# Patient Record
Sex: MALE | Race: WHITE | NOT HISPANIC OR LATINO | Employment: FULL TIME | URBAN - METROPOLITAN AREA
[De-identification: names, ages, dates, MRNs, and addresses within clinical notes are randomized per-mention and may not be internally consistent; named-entity substitution may affect disease eponyms.]

---

## 2017-01-24 ENCOUNTER — OFFICE VISIT - HEALTHEAST (OUTPATIENT)
Dept: FAMILY MEDICINE | Facility: CLINIC | Age: 52
End: 2017-01-24

## 2017-01-24 DIAGNOSIS — F10.10 ALCOHOL ABUSE: ICD-10-CM

## 2017-01-24 DIAGNOSIS — F41.1 GENERALIZED ANXIETY DISORDER: ICD-10-CM

## 2017-01-24 ASSESSMENT — MIFFLIN-ST. JEOR: SCORE: 1684.83

## 2017-02-09 ENCOUNTER — OFFICE VISIT - HEALTHEAST (OUTPATIENT)
Dept: BEHAVIORAL HEALTH | Facility: CLINIC | Age: 52
End: 2017-02-09

## 2017-02-09 ENCOUNTER — COMMUNICATION - HEALTHEAST (OUTPATIENT)
Dept: FAMILY MEDICINE | Facility: CLINIC | Age: 52
End: 2017-02-09

## 2017-02-09 DIAGNOSIS — F41.1 GAD (GENERALIZED ANXIETY DISORDER): ICD-10-CM

## 2017-02-09 DIAGNOSIS — F10.20 MODERATE ALCOHOL USE DISORDER (H): ICD-10-CM

## 2017-02-20 ENCOUNTER — COMMUNICATION - HEALTHEAST (OUTPATIENT)
Dept: FAMILY MEDICINE | Facility: CLINIC | Age: 52
End: 2017-02-20

## 2017-02-20 DIAGNOSIS — J30.2 SEASONAL ALLERGIES: ICD-10-CM

## 2017-03-01 ENCOUNTER — OFFICE VISIT - HEALTHEAST (OUTPATIENT)
Dept: BEHAVIORAL HEALTH | Facility: CLINIC | Age: 52
End: 2017-03-01

## 2017-03-01 DIAGNOSIS — F41.1 GAD (GENERALIZED ANXIETY DISORDER): ICD-10-CM

## 2017-03-01 DIAGNOSIS — F10.20 MODERATE ALCOHOL USE DISORDER (H): ICD-10-CM

## 2017-03-09 ENCOUNTER — OFFICE VISIT - HEALTHEAST (OUTPATIENT)
Dept: BEHAVIORAL HEALTH | Facility: CLINIC | Age: 52
End: 2017-03-09

## 2017-03-09 DIAGNOSIS — F41.1 GAD (GENERALIZED ANXIETY DISORDER): ICD-10-CM

## 2017-03-09 DIAGNOSIS — F10.20 MODERATE ALCOHOL USE DISORDER (H): ICD-10-CM

## 2017-03-15 ENCOUNTER — OFFICE VISIT - HEALTHEAST (OUTPATIENT)
Dept: BEHAVIORAL HEALTH | Facility: CLINIC | Age: 52
End: 2017-03-15

## 2017-03-15 DIAGNOSIS — F10.20 MODERATE ALCOHOL USE DISORDER (H): ICD-10-CM

## 2017-03-15 DIAGNOSIS — F41.1 GAD (GENERALIZED ANXIETY DISORDER): ICD-10-CM

## 2017-03-16 ENCOUNTER — OFFICE VISIT - HEALTHEAST (OUTPATIENT)
Dept: BEHAVIORAL HEALTH | Facility: CLINIC | Age: 52
End: 2017-03-16

## 2017-03-16 ENCOUNTER — OFFICE VISIT - HEALTHEAST (OUTPATIENT)
Dept: FAMILY MEDICINE | Facility: CLINIC | Age: 52
End: 2017-03-16

## 2017-03-16 DIAGNOSIS — F41.1 GAD (GENERALIZED ANXIETY DISORDER): ICD-10-CM

## 2017-03-16 DIAGNOSIS — F10.10 ALCOHOL ABUSE: ICD-10-CM

## 2017-03-16 DIAGNOSIS — F10.20 MODERATE ALCOHOL USE DISORDER (H): ICD-10-CM

## 2017-03-16 ASSESSMENT — MIFFLIN-ST. JEOR: SCORE: 1653.08

## 2017-03-29 ENCOUNTER — OFFICE VISIT - HEALTHEAST (OUTPATIENT)
Dept: BEHAVIORAL HEALTH | Facility: CLINIC | Age: 52
End: 2017-03-29

## 2017-03-29 DIAGNOSIS — F41.1 GAD (GENERALIZED ANXIETY DISORDER): ICD-10-CM

## 2017-03-29 DIAGNOSIS — F10.20 MODERATE ALCOHOL USE DISORDER (H): ICD-10-CM

## 2017-04-09 ENCOUNTER — COMMUNICATION - HEALTHEAST (OUTPATIENT)
Dept: FAMILY MEDICINE | Facility: CLINIC | Age: 52
End: 2017-04-09

## 2017-04-09 DIAGNOSIS — J45.20 MILD INTERMITTENT ASTHMA, UNCOMPLICATED: ICD-10-CM

## 2017-12-13 ENCOUNTER — COMMUNICATION - HEALTHEAST (OUTPATIENT)
Dept: FAMILY MEDICINE | Facility: CLINIC | Age: 52
End: 2017-12-13

## 2017-12-13 DIAGNOSIS — J45.20 MILD INTERMITTENT ASTHMA, UNCOMPLICATED: ICD-10-CM

## 2019-01-28 ENCOUNTER — OFFICE VISIT - HEALTHEAST (OUTPATIENT)
Dept: FAMILY MEDICINE | Facility: CLINIC | Age: 54
End: 2019-01-28

## 2019-01-28 DIAGNOSIS — J45.20 MILD INTERMITTENT ASTHMA, UNCOMPLICATED: ICD-10-CM

## 2019-01-28 DIAGNOSIS — M21.42 FLAT FEET, BILATERAL: ICD-10-CM

## 2019-01-28 DIAGNOSIS — Z91.018 ALLERGY TO NUTS: ICD-10-CM

## 2019-01-28 DIAGNOSIS — F10.10 ALCOHOL ABUSE: ICD-10-CM

## 2019-01-28 DIAGNOSIS — M21.41 FLAT FEET, BILATERAL: ICD-10-CM

## 2019-01-28 DIAGNOSIS — Z23 NEED FOR TDAP VACCINATION: ICD-10-CM

## 2019-01-28 LAB
ALBUMIN SERPL-MCNC: 4.1 G/DL (ref 3.5–5)
ALP SERPL-CCNC: 54 U/L (ref 45–120)
ALT SERPL W P-5'-P-CCNC: 33 U/L (ref 0–45)
AST SERPL W P-5'-P-CCNC: 24 U/L (ref 0–40)
BILIRUB DIRECT SERPL-MCNC: 0.2 MG/DL
BILIRUB SERPL-MCNC: 0.6 MG/DL (ref 0–1)
PROT SERPL-MCNC: 7.2 G/DL (ref 6–8)

## 2019-01-28 RX ORDER — ALBUTEROL SULFATE 90 UG/1
2 AEROSOL, METERED RESPIRATORY (INHALATION) EVERY 6 HOURS PRN
Qty: 3 INHALER | Refills: 3 | Status: SHIPPED | OUTPATIENT
Start: 2019-01-28 | End: 2021-08-24

## 2019-01-28 ASSESSMENT — MIFFLIN-ST. JEOR: SCORE: 1735.27

## 2019-02-06 ENCOUNTER — AMBULATORY - HEALTHEAST (OUTPATIENT)
Dept: PODIATRY | Facility: CLINIC | Age: 54
End: 2019-02-06

## 2019-02-07 ENCOUNTER — OFFICE VISIT - HEALTHEAST (OUTPATIENT)
Dept: PODIATRY | Facility: CLINIC | Age: 54
End: 2019-02-07

## 2019-02-07 DIAGNOSIS — M21.6X2 PRONATION DEFORMITY OF BOTH FEET: ICD-10-CM

## 2019-02-07 DIAGNOSIS — M21.6X1 PRONATION DEFORMITY OF BOTH FEET: ICD-10-CM

## 2019-02-07 ASSESSMENT — MIFFLIN-ST. JEOR: SCORE: 1734.73

## 2019-02-26 ENCOUNTER — AMBULATORY - HEALTHEAST (OUTPATIENT)
Dept: OTHER | Facility: CLINIC | Age: 54
End: 2019-02-26

## 2019-03-11 ENCOUNTER — OFFICE VISIT - HEALTHEAST (OUTPATIENT)
Dept: FAMILY MEDICINE | Facility: CLINIC | Age: 54
End: 2019-03-11

## 2019-03-11 DIAGNOSIS — Z13.1 SCREENING FOR DIABETES MELLITUS: ICD-10-CM

## 2019-03-11 DIAGNOSIS — F10.90 HEAVY ALCOHOL USE: ICD-10-CM

## 2019-03-11 DIAGNOSIS — Z13.220 SCREENING FOR CHOLESTEROL LEVEL: ICD-10-CM

## 2019-03-11 DIAGNOSIS — Z13.0 SCREENING FOR DEFICIENCY ANEMIA: ICD-10-CM

## 2019-03-11 LAB
ALBUMIN SERPL-MCNC: 4 G/DL (ref 3.5–5)
ALP SERPL-CCNC: 51 U/L (ref 45–120)
ALT SERPL W P-5'-P-CCNC: 24 U/L (ref 0–45)
AST SERPL W P-5'-P-CCNC: 18 U/L (ref 0–40)
BILIRUB DIRECT SERPL-MCNC: 0.2 MG/DL
BILIRUB SERPL-MCNC: 0.4 MG/DL (ref 0–1)
CHOLEST SERPL-MCNC: 206 MG/DL
ERYTHROCYTE [DISTWIDTH] IN BLOOD BY AUTOMATED COUNT: 11.2 % (ref 11–14.5)
FASTING STATUS PATIENT QL REPORTED: YES
FASTING STATUS PATIENT QL REPORTED: YES
GLUCOSE BLD-MCNC: 72 MG/DL (ref 70–99)
HCT VFR BLD AUTO: 48.1 % (ref 40–54)
HDLC SERPL-MCNC: 46 MG/DL
HGB BLD-MCNC: 16.1 G/DL (ref 14–18)
LDLC SERPL CALC-MCNC: 132 MG/DL
MCH RBC QN AUTO: 30.7 PG (ref 27–34)
MCHC RBC AUTO-ENTMCNC: 33.6 G/DL (ref 32–36)
MCV RBC AUTO: 91 FL (ref 80–100)
PLATELET # BLD AUTO: 144 THOU/UL (ref 140–440)
PMV BLD AUTO: 8.7 FL (ref 7–10)
PROT SERPL-MCNC: 6.9 G/DL (ref 6–8)
RBC # BLD AUTO: 5.26 MILL/UL (ref 4.4–6.2)
TRIGL SERPL-MCNC: 141 MG/DL
WBC: 3.9 THOU/UL (ref 4–11)

## 2019-03-12 ENCOUNTER — AMBULATORY - HEALTHEAST (OUTPATIENT)
Dept: OTHER | Facility: CLINIC | Age: 54
End: 2019-03-12

## 2019-04-07 ENCOUNTER — COMMUNICATION - HEALTHEAST (OUTPATIENT)
Dept: FAMILY MEDICINE | Facility: CLINIC | Age: 54
End: 2019-04-07

## 2019-04-07 DIAGNOSIS — F10.90 HEAVY ALCOHOL USE: ICD-10-CM

## 2019-05-03 ENCOUNTER — AMBULATORY - HEALTHEAST (OUTPATIENT)
Dept: LAB | Facility: CLINIC | Age: 54
End: 2019-05-03

## 2019-05-03 DIAGNOSIS — F10.90 HEAVY ALCOHOL USE: ICD-10-CM

## 2019-05-03 LAB
ALBUMIN SERPL-MCNC: 4.4 G/DL (ref 3.5–5)
ALP SERPL-CCNC: 55 U/L (ref 45–120)
ALT SERPL W P-5'-P-CCNC: 23 U/L (ref 0–45)
AST SERPL W P-5'-P-CCNC: 17 U/L (ref 0–40)
BILIRUB DIRECT SERPL-MCNC: 0.2 MG/DL
BILIRUB SERPL-MCNC: 0.8 MG/DL (ref 0–1)
PROT SERPL-MCNC: 7.4 G/DL (ref 6–8)

## 2019-09-16 ENCOUNTER — OFFICE VISIT - HEALTHEAST (OUTPATIENT)
Dept: FAMILY MEDICINE | Facility: CLINIC | Age: 54
End: 2019-09-16

## 2019-09-16 DIAGNOSIS — M25.511 CHRONIC RIGHT SHOULDER PAIN: ICD-10-CM

## 2019-09-16 DIAGNOSIS — G89.29 CHRONIC RIGHT SHOULDER PAIN: ICD-10-CM

## 2019-09-20 ENCOUNTER — HOSPITAL ENCOUNTER (OUTPATIENT)
Dept: MRI IMAGING | Facility: CLINIC | Age: 54
Discharge: HOME | End: 2019-09-20
Attending: NURSE PRACTITIONER

## 2019-09-20 DIAGNOSIS — M25.511 CHRONIC RIGHT SHOULDER PAIN: ICD-10-CM

## 2019-09-20 DIAGNOSIS — G89.29 CHRONIC RIGHT SHOULDER PAIN: ICD-10-CM

## 2019-09-23 ENCOUNTER — AMBULATORY - HEALTHEAST (OUTPATIENT)
Dept: FAMILY MEDICINE | Facility: CLINIC | Age: 54
End: 2019-09-23

## 2019-09-23 DIAGNOSIS — G89.29 CHRONIC RIGHT SHOULDER PAIN: ICD-10-CM

## 2019-09-23 DIAGNOSIS — M25.511 CHRONIC RIGHT SHOULDER PAIN: ICD-10-CM

## 2019-09-23 DIAGNOSIS — S43.431A LABRAL TEAR OF SHOULDER, RIGHT, INITIAL ENCOUNTER: ICD-10-CM

## 2019-10-09 ENCOUNTER — RECORDS - HEALTHEAST (OUTPATIENT)
Dept: ADMINISTRATIVE | Facility: OTHER | Age: 54
End: 2019-10-09

## 2019-10-21 ENCOUNTER — COMMUNICATION - HEALTHEAST (OUTPATIENT)
Dept: FAMILY MEDICINE | Facility: CLINIC | Age: 54
End: 2019-10-21

## 2019-10-21 DIAGNOSIS — J45.20 MILD INTERMITTENT ASTHMA, UNCOMPLICATED: ICD-10-CM

## 2019-10-27 ENCOUNTER — COMMUNICATION - HEALTHEAST (OUTPATIENT)
Dept: FAMILY MEDICINE | Facility: CLINIC | Age: 54
End: 2019-10-27

## 2019-10-27 DIAGNOSIS — F10.90 HEAVY ALCOHOL USE: ICD-10-CM

## 2019-11-24 ENCOUNTER — RECORDS - HEALTHEAST (OUTPATIENT)
Dept: ADMINISTRATIVE | Facility: OTHER | Age: 54
End: 2019-11-24

## 2020-05-05 ENCOUNTER — COMMUNICATION - HEALTHEAST (OUTPATIENT)
Dept: FAMILY MEDICINE | Facility: CLINIC | Age: 55
End: 2020-05-05

## 2020-05-05 DIAGNOSIS — F10.90 HEAVY ALCOHOL USE: ICD-10-CM

## 2020-07-28 ENCOUNTER — COMMUNICATION - HEALTHEAST (OUTPATIENT)
Dept: FAMILY MEDICINE | Facility: CLINIC | Age: 55
End: 2020-07-28

## 2020-07-28 DIAGNOSIS — F10.90 HEAVY ALCOHOL USE: ICD-10-CM

## 2020-08-31 ENCOUNTER — OFFICE VISIT - HEALTHEAST (OUTPATIENT)
Dept: FAMILY MEDICINE | Facility: CLINIC | Age: 55
End: 2020-08-31

## 2020-08-31 DIAGNOSIS — J45.20 MILD INTERMITTENT ASTHMA WITHOUT COMPLICATION: ICD-10-CM

## 2020-08-31 DIAGNOSIS — F10.90 HEAVY ALCOHOL USE: ICD-10-CM

## 2020-08-31 DIAGNOSIS — Z91.018 ALLERGY TO TREE NUTS: ICD-10-CM

## 2020-08-31 DIAGNOSIS — F10.10 ALCOHOL ABUSE: ICD-10-CM

## 2020-08-31 DIAGNOSIS — Z91.018 ALLERGY TO NUTS: ICD-10-CM

## 2020-08-31 RX ORDER — NALTREXONE HYDROCHLORIDE 50 MG/1
50 TABLET, FILM COATED ORAL DAILY
Qty: 90 TABLET | Refills: 3 | Status: SHIPPED | OUTPATIENT
Start: 2020-08-31 | End: 2021-08-24

## 2020-08-31 ASSESSMENT — PATIENT HEALTH QUESTIONNAIRE - PHQ9: SUM OF ALL RESPONSES TO PHQ QUESTIONS 1-9: 1

## 2020-08-31 NOTE — ASSESSMENT & PLAN NOTE
Naltrexone 50 mg Daily   No side effects  Strange Dreams >> improved   No other downside     Alcohol Abuse    COntinue

## 2020-08-31 NOTE — ASSESSMENT & PLAN NOTE
"Advair 1 puff two times a day  Rinse    Triggers :   \"well controlled long time\"  Exercise + Cold weather     AlbuterolNot used in years       "

## 2021-01-02 ENCOUNTER — COMMUNICATION - HEALTHEAST (OUTPATIENT)
Dept: FAMILY MEDICINE | Facility: CLINIC | Age: 56
End: 2021-01-02

## 2021-01-02 DIAGNOSIS — J45.20 MILD INTERMITTENT ASTHMA, UNCOMPLICATED: ICD-10-CM

## 2021-05-27 ASSESSMENT — PATIENT HEALTH QUESTIONNAIRE - PHQ9: SUM OF ALL RESPONSES TO PHQ QUESTIONS 1-9: 1

## 2021-05-27 NOTE — TELEPHONE ENCOUNTER
Pt increased Naltrexone dose from 25 mg to 50 mg.  Did you want to do lab work again once pt has been on it for 30 days?  Rx was sent for Naltrexone 50 mg, take 0.5 tablet daily on 3/11/19.  I pended Rx for Naltrexone 50 mg, 1 tablet daily for you to send.

## 2021-05-28 ASSESSMENT — ASTHMA QUESTIONNAIRES
ACT_TOTALSCORE: 25
ACT_TOTALSCORE: 25

## 2021-05-30 VITALS — BODY MASS INDEX: 21.82 KG/M2 | HEIGHT: 74 IN | WEIGHT: 170 LBS

## 2021-05-30 VITALS — BODY MASS INDEX: 20.92 KG/M2 | HEIGHT: 74 IN | WEIGHT: 163 LBS

## 2021-06-01 NOTE — PROGRESS NOTES
Assessment & Plan   1. Chronic right shoulder pain  Chronic right shoulder pain with acute worsening within the last 2-3 months.  No known injury though he has noted new onset of weakness on the right side which I do appreciate on exam today.  Concern for possible rotator cuff tear, will order MRI to assess.  Discussed based on results would recommend PT or consult with orthopedics and we will be in contact with these.   - MR Shoulder Without Contrast Right; Future    Maya Stratton CNP    Subjective   Chief Complaint:  Shoulder Pain (pt states that he has had bilateral shoulder pain on and off for the last 8 years)    HPI:   Ramírez Whittaker is a 53 y.o. male who presents for right shoulder pain.     He presents for right shoulder pain.  Present on and off for the last eight years.  Worsening over the past 2-3 months.  Now noting weakness when he tries to lift at the gym.  Pain with overhead reaching, external rotation, putting on shirts.  Not aware of any specific injury.  No prior history of shoulder pathology.        Allergies:  is allergic to milk and nuts - unspecified.    SH/FH:  Social History and Family History reviewed and updated.   Tobacco Status:  He  reports that he has never smoked. He has never used smokeless tobacco.    Review of Systems:  A complete head to toe ROS is negative unless otherwise noted in HPI    Objective     Vitals:    09/16/19 1457   BP: 90/58   Patient Site: Left Arm   Patient Position: Sitting   Cuff Size: Adult Large   Pulse: 68   Weight: 173 lb 12 oz (78.8 kg)       Physical Exam:  GENERAL: Alert, well-appearing male .   PSYCH: Pleasant mood, affect appropriate.    MSK: Shoulders in alignment.  No AC or SC joint tenderness to palpation.  Flexion to 180 degrees.  Abduction to 135 degrees active and 180 degrees passive.  Internal rotation intact.  External rotation quite painful, very limited.  Negative impingement testing.  UE strength 4/5 on right and 5/5 on left.   NEURO: DTRs  2/4.

## 2021-06-02 VITALS — WEIGHT: 181 LBS | BODY MASS INDEX: 23.23 KG/M2 | HEIGHT: 74 IN

## 2021-06-02 VITALS — HEIGHT: 74 IN | WEIGHT: 181.12 LBS | BODY MASS INDEX: 23.24 KG/M2

## 2021-06-02 VITALS — BODY MASS INDEX: 22.89 KG/M2 | WEIGHT: 179.5 LBS

## 2021-06-02 NOTE — TELEPHONE ENCOUNTER
RN cannot approve Refill Request    RN can NOT refill this medication Protocol failed and NO refill given. Last office visit: 9/16/2019 Maya Stratton CNP Last Physical: Visit date not found Last MTM visit: Visit date not found Last visit same specialty: 9/16/2019 Maya Stratton CNP.  Next visit within 3 mo: Visit date not found  Next physical within 3 mo: Visit date not found      Barbara Blakely, [facility] Triage/Med Refill 10/27/2019    Requested Prescriptions   Pending Prescriptions Disp Refills     naltrexone (DEPADE) 50 mg tablet [Pharmacy Med Name: NALTREXONE 50 MG TABLET] 90 tablet 1     Sig: TAKE 1 TABLET BY MOUTH EVERY DAY       There is no refill protocol information for this order

## 2021-06-02 NOTE — TELEPHONE ENCOUNTER
RN cannot approve Refill Request    RN can NOT refill this medication med is not covered by policy/route to provider. Last office visit: 9/16/2019 Maya Stratton CNP Last Physical: Visit date not found Last MTM visit: Visit date not found Last visit same specialty: 9/16/2019 Maya Stratton CNP.  Next visit within 3 mo: Visit date not found  Next physical within 3 mo: Visit date not found      Madhavi Pino, [facility] Triage/Med Refill 10/21/2019    Requested Prescriptions   Pending Prescriptions Disp Refills     ADVAIR DISKUS 250-50 mcg/dose DISKUS [Pharmacy Med Name: ADVAIR 250-50 DISKUS]  2     Sig: INHALE 1 PUFF BY MOUTH TWICE A DAY       There is no refill protocol information for this order

## 2021-06-03 VITALS
HEART RATE: 68 BPM | BODY MASS INDEX: 22.16 KG/M2 | DIASTOLIC BLOOD PRESSURE: 58 MMHG | SYSTOLIC BLOOD PRESSURE: 90 MMHG | WEIGHT: 173.75 LBS

## 2021-06-07 NOTE — TELEPHONE ENCOUNTER
RN cannot approve Refill Request    RN can NOT refill this medication med is not covered by policy/route to provider. Last office visit: 9/16/2019 Maya Stratton CNP Last Physical: Visit date not found Last MTM visit: Visit date not found Last visit same specialty: 9/16/2019 Maya Stratton CNP.  Next visit within 3 mo: Visit date not found  Next physical within 3 mo: Visit date not found      Sherrie Ruvalcaba, [facility] Triage/Med Refill 5/5/2020    Requested Prescriptions   Pending Prescriptions Disp Refills     naltrexone (DEPADE) 50 mg tablet [Pharmacy Med Name: NALTREXONE 50 MG TABLET] 90 tablet 1     Sig: TAKE 1 TABLET BY MOUTH EVERY DAY       There is no refill protocol information for this order

## 2021-06-08 NOTE — PROGRESS NOTES
Subjective   Chief Complaint:  Med check (Naltrexone)    HPI:   Ramírez Whittaker is a 51 y.o. male who presents for medication check.       He was seen one month ago by Dr. Rogers for anxiety and concerns with alcohol abuse.  He was referred to  outpatient chemical dependency program and did have an evaluation completed.  First appointment is in two weeks.  It was recommended by the evaluator that he start Naltrexone, Dr. Rogers did send in a prescription that he started 1/1.  He is here today for follow up.      He states he has noted a decrease in desire to consume alcohol.  Was previously drinking 6-8 beers/day and now 1-2/day.  At times pours a drink and pours it out.  He does still have the desire to drink every day though has had one day where he does not drink.    He does note experiencing headache several days a week with the new medication. Typically at night.  Relieved with ibuprofen or Excedrin migraine.     He has noted anxiety has improved slightly as well.  Still feels anxious most days.  Often related to work and stress with his position.  Has not previously received treatment for anxiety or chemical dependency.  Would like to discuss medication options      PMH:   Patient Active Problem List   Diagnosis     Mild intermittent asthma     Allergy To Pollens Trees     Hyperlipidemia     Generalized Anxiety Disorder     Sinus Bradycardia     Alcohol abuse       Past Medical History   Diagnosis Date     Asthma        Current Medications:   Current Outpatient Prescriptions on File Prior to Visit   Medication Sig Dispense Refill     albuterol (VENTOLIN HFA) 90 mcg/actuation inhaler Inhale 2 puffs every 6 (six) hours as needed for wheezing. 3 Inhaler 03     ascorbic acid (ASCORBIC ACID WITH KOURTNEY HIPS) 500 MG tablet Take 500 mg by mouth daily.       cholecalciferol, vitamin D3, 1,000 unit tablet Take 1,000 Units by mouth daily.       fexofenadine (ALLEGRA) 180 MG tablet Take 180 mg by mouth daily.        fluticasone (FLONASE) 50 mcg/actuation nasal spray 1 spray into each nostril daily. 48 g 03     fluticasone-salmeterol (ADVAIR DISKUS) 250-50 mcg/dose DISKUS Inhale 1 puff 2 (two) times a day. 180 puff 3     L.acidophilus-Bif. animalis (PROBIOTIC) 5 billion cell CpSP Take 1 capsule by mouth daily.       naltrexone (DEPADE) 50 mg tablet Take 1 tablet (50 mg total) by mouth daily. 30 tablet 0     No current facility-administered medications on file prior to visit.        Allergies:  is allergic to milk and nuts - unspecified.    SH/FH:  Social History and Family History reviewed and updated.   Tobacco Status:  He  reports that he has never smoked. He has never used smokeless tobacco.    Review of Systems:  A complete head to toe ROS is negative unless otherwise noted in HPI    Objective     Vitals:    01/24/17 0812   BP: 104/60   Pulse: (!) 56   Weight: 170 lb (77.1 kg)     Wt Readings from Last 3 Encounters:   01/24/17 170 lb (77.1 kg)   11/30/16 177 lb (80.3 kg)   07/12/16 175 lb (79.4 kg)       Physical Exam:  GENERAL: Alert, cooperative, well-appearing male  PSYCH: Pleasant mood, affect appropriate.  Good judgment and insight.  Intact recent and remote memory.  Good eye contact.    Labs:    No results found for this or any previous visit (from the past 168 hour(s)).    Assessment & Plan   1. Alcohol abuse:  Discussed SE of HA.  He does have a milk allergy and notes headache when consuming milk.  Potential cross-reaction noted with prescribing.  As they are tolerable right now, he would like to continue.  Would consider acamprosate in the future if they become more bothersome. Naltrexone refilled, encouraged him to continue to follow up with outpatient counseling.  Will recheck liver functions today, follow up in two months.    - naltrexone (DEPADE) 50 mg tablet; Take 1 tablet (50 mg total) by mouth daily.  Dispense: 30 tablet; Refill: 1  - Hepatic Profile    2. Generalized anxiety disorder: Discussed options for  pharmacotherapy for generalized anxiety.  He will think about this after he has begun outpatient therapy     Maya Stratton, CNP

## 2021-06-08 NOTE — PROGRESS NOTES
"Diagnostic Assessment  [] Brief  [x] Standard    **Date(s): 2017  **Start Time:  1330  **Stop Time: 1430    Patient Name: Ramírez Whittaker  **Age: 51 y.o.    1965        Referral Source:   Therapist: JOSÉ MIGUEL Zhang        Persons Present: Ramírez Whittaker and JOSÉ MIGUEL Zhang    **Chief Complaint (in the patients words; reason patient believes they have been referred):  Patient states that his alcohol use is long term, 30 years of mostly non-dangerous use of 6-8 beers, moderate to low alcohol percentage beers, over a period of several hours, starting in the evening.  He also notes mild and persistent anxiety.  Patient notes that he has mild social anxiety, describes himself as introverted.  Anxiety is mainly work-related.     Patient s expectation for treatment (patient stated initial goal; i.e.: I want to let go of my worries , Medication treatment if indicated):  Patient states he would like to be able to control his alcohol use or abstain completely if he chooses to and would like to find ways of coping with his anxiety other than alcohol.    Sources/references used in completing this assessment: (face-to-face interview, Patient chart, adult intake questionnaire, etc.)  Face-to-Face interview, Patient chart, adult intake questionnaire    **Psychological Measures:  1. PANSI: Positive ideation score=4.5<3.4; Negative ideation score= 1>1.6.  Patient denies suicidal thoughts and/or planning and commits to seeking safety if his is unsafe in the community.   2. CAGE Aid= score of     3/4    Patient is not enrolled in chemical dependency program and is not interested in seeking help from such a program at this time.  3.  WHODAS: 2 percent disabled; h1-h3=0  4. PHQ-9=score of 2  Patient indicates that their depression sxs make it somewhat difficult to do his work, take care of things at home, or get along with other people.  5. PCL-5=score of  Patient wrote \"N/A\" under traumatic event  6. " Patient indicates that their anxiety sxs make it somewhat difficult to do his work, take care of things at home, or get along with other people.\\  7. Mood Disorder Questionnaire (Lifetime)= 13 NO s and 0 Yes responses.   8. Mood Disorder Questionnaire (Current)= 9 NO s and 4 Yes responses. Patient indicates it is:  minor problem but that sxs did not occur simultaneously, indicating negative screen for bipolar        Presenting Problem/History:    Functional Impairments:   Personal: 0  Family: 1  Social:0    Work: 1      How does the presenting problem affect patients daily functioning:    Patient states there are times when he may be irritable, causes him to withdraw at times at home.    Issues/Stressors:   Work is his main stressor right now.    Physical Problems: Headaches    Social Problems: none reported    Behavioral Problems: alcohol misuse    Cognitive Problems: none reported    Emotional Problems: Anxious     Onset/Frequency/Duration presenting problem symptoms:    Hugotient notes that he took note of his anxiety around when he got  and experienced a number of physical symptoms that he felt were stress induced, including eczema.    How does the patient perceive his/her problem in relation to how others see his/her problem?  Patient states that his wife, the person closest to him, has never talked with him about alcohol use and in general does not get a lot of feedback.    Family/Social History:     **Education (type and level of education):  B.A. [employer]    Problems with Learning or School (developmental issues, learning disabilities, behavioral concerns in school):  Patient states that he wishes he had spent more time studying in school, less socializing, but was a good student on the whole.    Marriages/Significant other (including patients evaluation of the relationship quality):  Patient states he has been  for 20 years, rates quality of relationship high.    Children (sex and  ages, any significant issues):  Two, 14 and 11, younger child has some anxiety issues.    Parents (ages, living or , how many years ):  Patient states his parents are alive, no mental health issues he is aware of.    Siblings (birth order, ages, significant issues):  Patient states he has two siblings, no mental health issues he is aware of.    Climate in family of origin (how does the patient perceive their childhood experience):  Patient states the climate was good, supportive, some tension with his father.     Developmental factors (developmental milestones, head injuries, CVA s, etc. that may have impeded milestones):  None reported    **Significant personal relationships including patient s evaluation of the relationship quality (Co-worker s, neighbor s, AA groups, Gnosticist peers, etc.):   Patient states that his family is close and that he is close with some college friends.    Significant life events (what does the patient identify as a personal life changing/influencing event):  None reported other than those noted elsewhere in this document    Sexual/physical/emotional/financial abuse/trauma (any child protection involvement; who reported, Impact on patient/family/other):   Patient denies.    **Contextual Non-personal factors contributing to the patients concerns (divorce in family, nation/natural disasters):  None reported    **Strengths/personal resources (what does the patient do well, what is going well in life, positive personality characteristics):  Patient states that he is a hard worker, is a good problem solver, empathetic.    Support network(s)/Resources (including strength and quality of social networks, who does the client consider supportive, other agencies or services patient uses):   Patient states that his wife is supportive of him, he is not comfortable with the idea of AA.    **Belief system:    Patient states that was raised Alevism, wife and children are Synagogue.  Patient  "states he is agnostic.    Cultural influences and impact on patient (ask about all aspects of culture and ask which are relevant to the patient. Go beyond nationality and ethnicity. Consider biases, life style, community style, i.e.: urban, poverty, abuse, etc). see page 5 Diagnostic Assessment, Clinical Training for descriptors):  Raised Presbyterian, currently upper middle class, educated at small liberal arts college and employed at same college, progressive politically, agnostic and uncomfortable with idea of AA as well as using psychotropic medications if at all possible to avoid.    **Cultural impact on health and health care (how does patient s culture influence how the patient receives health care):   Patient is agnostic which makes him reluctant to engage in AA and is reluctant to use psychotropic medications.    Current living situation (Household members, housing status, stability, multiple moves, potential eviction):  House, stable, lives with wife and children.    **Work History (current employment situation and any past employment history):  Patient states he has worked at [email or web address] for 15 years and is currently the  services.  He is responsible for a staff that works for him and responsible for \"a lot of money\" he states.    **Financial Concerns (basic status, housing, food, clothing are they on any assistance including SSI/SSDI):   Patient states he has no particular financial stresses    Legal Problems (DUI S, divorce, law suits, etc.):  Denies.    Hobbies/Interests:    Patient states he enjoys computers, wood working, home repair, jogging.      Family Mental Health/Medical History    Family Mental Health:    Patient denies.    Family history of Suicide:  Denies.    Family history Chemical Dependency:    Denies.    Family Medical history:   Heart disease runs in the family, sister has Crohn's.      Patient Medical History    Hospitalizations (When/Where):   "   Patient denies.    Medical diagnoses/concerns: (i.e.: Heart disease, thyroid problems,  Bld. Pressure,  seizures,  head Inj., Other)   Asthma, allergies, hyperlipidemia, sinus bradycardia (per chart)    Current physician/other non psychiatric medical provider s:    Angelina Ward MD                     Date of last medical exam:   Office visit 1/24/17    Current Medications:  Current Outpatient Prescriptions   Medication Sig Dispense Refill     albuterol (VENTOLIN HFA) 90 mcg/actuation inhaler Inhale 2 puffs every 6 (six) hours as needed for wheezing. 3 Inhaler 03     ascorbic acid (ASCORBIC ACID WITH KOURTNEY HIPS) 500 MG tablet Take 500 mg by mouth daily.       cholecalciferol, vitamin D3, 1,000 unit tablet Take 1,000 Units by mouth daily.       EPINEPHrine (EPIPEN) 0.3 mg/0.3 mL atIn Inject into the shoulder, thigh, or buttocks once.       fexofenadine (ALLEGRA) 180 MG tablet Take 180 mg by mouth daily.       fluticasone (FLONASE) 50 mcg/actuation nasal spray 1 spray into each nostril daily. 48 g 03     fluticasone-salmeterol (ADVAIR DISKUS) 250-50 mcg/dose DISKUS Inhale 1 puff 2 (two) times a day. 180 puff 3     L.acidophilus-Bif. animalis (PROBIOTIC) 5 billion cell CpSP Take 1 capsule by mouth daily.       naltrexone (DEPADE) 50 mg tablet Take 1 tablet (50 mg total) by mouth daily. 30 tablet 1     No current facility-administered medications for this visit.          Past Mental Health History:    Previous mental health diagnosis:  PETE     Date of diagnosis:  1/24/17    Hx of Mental Health Treatment or Services:  Patient denies.    IRVING Received:      [] Yes   [] No      Hx of MH Tx/Hospitalizations (When/Where: must include a review of patient s record.  If not available, why, what if anything are you doing to obtain a record?):   Denies.    Hx of Psychiatric Medications:  Patient denies.      Suicidal/Homicidal Risk Assessment:    Suicidal: None reported  Ideation:denies  History of Past Attempt(s):  description: denies.  Crisis Plan: Crisis plan not developed as patient denies symptoms.    Homicidal: None reported   Ideation: denies  History of Aggression towards others: patient denies  Crisis Plan: Crisis plan not developed as patient denies symptoms.    Self-Injuring Behaviors: denies  History of SIB: patient denies  Crisis Plan: Crisis plan not developed as patient denies symptoms.    History of destruction to property: patient denies  Description: patient denies  Crisis Plan:  Crisis plan not developed as patient denies symptoms.      Non- Substance Abuse addictive Behaviors/Compulsive Behaviors:  [] Gambling     [] Sex     [] Pornography    [] Shopping     [] Eating     [] Self-Injury  [] Other           [x] None Reported      Chemical Use/Abuse History    CAGE-AID (screening to determine a patients use/abuse/dependency):      3/4      Alcohol:   [] None Reported    [x] Yes   [] No  Type: beer, 6-8 beers a night, medium to low alcohol beers.  Recently he has been drinking more like 1-2 beers a night or none at all, which he states may be due to naltrexone use.          Street Drugs:   [] None Reported    [] Yes   [x] No    Prescription Drugs:   [] None Reported    [] Yes   [x] No    Tobacco:    [] None Reported    [] Yes   [x] No    Caffeine:   [] None Reported    [x] Yes   [] No  Type: coffee; 2-3 cups, green tea later in the day.     Currently in a treatment program:   [] Yes   [x] No       IRVING Received:    [] Yes   [x] No         Collaborative info requested/received:   [] Yes   [x] No      Comments:     History of CD Treatment:      [x] None Reported               MENTAL STATUS EVALUATION    Grooming: Well groomed  Attire: Appropriate  Age: Appears Stated  Behavior Towards Examiner: Cooperative  Motor Activity: Within normal   Eye Contact: Appropriate  Mood: Euthymic  Affect: Congruent w/content of speech  Speech/Language: Within normal  Attention: Within normal  Concentration: Within normal  Thought  "Process: Within normal  Thought Content: Hallucinations: Within noraml  Delusions: Within normal  Orientation: X 3  Memory: No Evidence of Impairment  Judgment: No Evidence of Impairment  Estimated Intelligence: Above Average  Demonstrated Insight: Adequate  Fund of Knowledge: adequate      Clinical Impressions/Assessment/Recommendations: (Stands alone; is a synopsis of patients story, any impacting family or cultural issue on diagnosis and how patient meets criteria for diagnosis).     Ramírez Whittaker provided background information via the Adult Intake Questionnaire, psychological measures (scores are documented at the beginning of this DA), and face-to-face interview.  [facility] medical records were consulted to complete this DA.  The patient was referred to this therapist by Angelina Ward.      Ramírez Whittaker is a 51 y.o. White or  male presenting to therapy for assistance with anxiety and alcohol misuse.  The patient advises his desired outcomes of therapy are to curb or eliminate his alcohol use and to find ways to manage his anxiety that don't involve using alcohol.  Ramírez Whittaker indicates he first became aware of his anxiety when he got  to his wife and began feeling quite a bit of stress over sharing space, her less neat ways of keeping the house, etc., and he began to experience some physical symptoms as well such as eczema (he believes this was a stress reaction).  The patient has attempted to eliminate or manage his anxiety problems by drinking alcohol, which he has done for all of his adult life.  He drinks about 6-8 beers/night, and his wife understands this as his way of dealing with anxiety.  Patient reports that he is \"not quite obsessive compulsive\" but does like things to be neat and that it bothers him when things are out of place or when he cannot follow a certain routine.  He notes that he finds his work at [employer], where he was a student in college " many years ago and where he has worked for the past 15 years--and where he is now the --quite stressful.  He attributes the stress at work to the large amount of responsibility, and he notes that this stress carries over into his home life by causing him to be withdrawn when he returns home after his day at work.  His anxiety appears to range over a variety of things, often related to work but not especially related to socializing or public performance.  He reports that his alcohol use does not cause hangovers for him that he is aware of, but through talking with him he became aware of the emotional effects of alcohol overuse such as anxiety and depression.  He notes that his alcohol use has decreased since he began using naltrexone in early January, and that his anxiety has decreased during this time as well--though he hadn't until now made this connection between alcohol use and anxiety.    Patient does not seem to qualify for a depression diagnosis at this time, though we did discuss what sounds like a diminished ability to feel pleasure or feel interest in things, and we discussed the role that alcohol could be playing in sustaining this quasi-anhedonia.     Patient reports he is happily , lives in a single family home, does not have financial concerns, has two healthy children, one of whom has some anxiety issues.  He is successful in his work and it appears that his mental health issues have at most only mildly impacted his performance, by making him irritable, impatient, in a bad mood, etc.  He does not miss work due to alcohol use or anxiety and does not drink until he gets home from work.     Based on the information gathered in this diagnostic assessment, the patient's reported symptoms meet criteria for the following DSM-5 Diagnosis and associated rule-outs:      Diagnosis:     Generalized Anxiety Disorder    Alcohol Use Disorder, moderate      It is recommended that the  patient begin individual psychotherapy with follow-up appointments scheduled weekly or biweekly.  The patient is not interested in receiving psychiatric med management services at this time.      Ramírez Whittaker would be best serviced by therapeutic interventions that provide a client centered atmosphere with positive regard.  In addition, the patient may benefit from cognitive behavioral therapies for anxiety reduction, along with Motivational Interviewing.  Patient indicated an interest in and openness to mindfulness techniques, and patient may benefit from individual or group mindfulness practice.      Assessment of client resolving presenting mental health concerns:  Ability  [] low     [] average     [x] high  Motivation [] low     [] average     [x] high  Willingness [] low     [] average     [x] high        Initial Therapy Plan (ex: develop therapeutic relationship with therapist, Refer to psychiatry/psych testing, etc.):    1. Return to therapy to discuss outcome of diagnostic assessment and create a treatment plan.    2. Discuss group therapy options.    3. Continue discussion about benefits of psychiatric medication intervention.    4.  Develop therapeutic relationship with therapist      Is patient's family involved in the treatment?  [x] No     [] Yes      Therapist s Signature/Supervision/co-signature statement:   Performed and documented by TIFFANIE Barney, LICSW

## 2021-06-09 NOTE — PROGRESS NOTES
Mindfulness Based Cognitive Therapy group      Date of Service: 03/16/2017 Start Time: 1400 Stop Time:1530 Total Time: 90 minutes  Frequency: Thursdays  CPT: 98028    Care [facility] TIFFANIE Barney, [facility],  N= 5     Session #2      Subjective: The patient was seen today for a 90 minute group psychotherapy session held at [facility].       Mental Status Exam:    Patient was pleasant and cooperative. The patient was oriented X4. The patient made good eye contact and was well dressed with good grooming and hygiene. Recent and remote memories were intact. Concentration and focus was normal. Speech (tone, volume, and rate) were intact. Mood and affect were euthymic and appropriate for the content of the session. Fund of knowledge was normal. Thought process was logical and linear. Insight and judgment were intact. SI not indicated. Pt continues to be motivated for treatment.       Objective:    Patient was able to participate and benefit from treatment as evidenced by the patient s verbal expression, participation in mindfulness activities, and understanding of idea discussed.       New Symptoms or Complaints: The patient did not disclose any new symptoms to the undersigned.       Reason Patient is participating in the group: The group session was necessary for the care of the patient to address how symptoms impact the patient s depression symptoms and overall well-being.        Patient's response to current intervention: Patient was receptive of feedback given, supportive of other group members and appears to have benefited from the group process. No barriers to learning evidenced.        Progress toward short-term goals: Patient attended group session and actively participated in group.       Patient's Impressions: The patient indicated readiness to learn by the patient s choice to attend group.       Review of long term goals: See treatment plan developed by outpatient  counselor.      Are there any new goals: There were no new goals brought to the undersigned s attention.        The patient completed today s group session for the processing of Mindfulness Based Cognitive Therapy Group content and interventions performed in this session: Check-in, the connection between thoughts, feelings, and behaviors. Group members participated in activities including body scan and mindfulness.      Patient education on mindfulness and depression during this session. The patient was given an opportunity to ask questions and participate in the group discussion. The patient exhibited individual understanding by asking relevant questions and making appropriate comments to other group members.          Assessment:  1. Major depressive disorder, recurrent episode, moderate    2. Dysthymia       No indication of SI, plan and/or means.       Providers Impression of Current Status:   Patient participated actively in today's group therapy session and appears to have learned about the effect of mindfulness on relapse risk as well as certain mindfulness skills.  Patient will continue to benefit from ongoing psychotherapy that uses  MBCT.      Psychotherapeutic Techniques: A Mindfulness Based Cognitive Therapy modality was used       PLAN:    Follow-up: Continue to attend group to address mental health symptom(s)       Discharge Criteria Planning: Alleviation of mental health symptoms.       Encounter performed and documented by TIFFANIE Barney, LICSW

## 2021-06-09 NOTE — PROGRESS NOTES
Mental Health Visit Note    3/1/17    Start time: 1530    Stop Time: 1620  Session # 1    Ramírez Whittaker is a 51 y.o. male is being seen today for a follow-up psychotherapy appointment    Chief Complaint   Patient presents with      Follow Up   .     New symptoms or complaints:  None    Functional Impairment:   The patient identifies the how daily stressors affect daily functioning in today's session as follows (Scale is 1-4, 1=not at all or rarely; 4=extremely so/everyday.)    Personal: 1  Family: 1  Social: 1  Work: 1    Clinical assessment of mental status:   Ramírez Whittaker presented on time.   He was oriented x3, open and cooperative, and dressed appropriately for this session and weather. His memory was Normal cognitive functioning .  His speech was  Within normal.  Language was normal.  Concentration and focus is Within normal. Psychosis is not noted or reported. He reports his mood is euthymic.  Affect is congruent with speech and is euthymic  Fund of knowledge is adequate. Insight is adequate for therapy.     Suicidal/Homicidal Ideation present:   No,  Patient denies suicidal and homicidal ideations/means or plans.      Patient's impression of their current status:  Patient indicates continued success in drinking less alcohol than usual, about four beers in the last week total.  He notes that his anxiety is somewhat better now that he has stopped drinking.  He also notes that he is worried about his drinking once he stops using naltrexone, and whether he will have to stop drinking completely off of naltrexone or if he could continue to drink more moderately.    Therapist impression of patient's current state:   Ramírez Whittaker presents with anxiety and alcohol use disorder.  I reinforced the determination with which he is approaching his behavioral health issues and reviewed with him the evidence I'm aware of regarding returning to non-problematic drinking after a period of problematic drinking.  Used  motivational interviewing with him to help him clarify his values and sense of ability to stop drinking if this is what he determines he needs to do.  Invited him to attend MBCT group on Thursdays.    Type of psychotherapeutic technique provided:   CBT    Progress toward short term goals:Progress as expected, tx plan not yet developed    Review of long term goals: Not done at today's visit ..    Diagnosis:   1. PETE (generalized anxiety disorder)    2. Moderate alcohol use disorder    Improving    Plan and Follow-up:   Patient will follow safety plan of seeking help from friend/family, calling [facility], calling 911, and/or presenting to the ED if necessary.  Patient will be compliant with medications and attend appointments as scheduled.  Patient will try to attend MBCT group next Thursday.    Discharge Criteria/Planning: Patient will continue with follow-up until therapy can be discontinued without return of signs and symptoms.    Signatures:   Performed and documented by TIFFANIE Barney, LICSW

## 2021-06-09 NOTE — PROGRESS NOTES
Mindfulness Based Cognitive Therapy group      Date of Service: 03/09/2017 Start Time: 2:00 Stop Time:32:30 Total Time: 90 minutes  Frequency: Thursdays  CPT: 98086    Care Provider: [facility]att [facility], TIFFANIE Barney, [facility],  N= 6    Session #1     Subjective: The patient was seen today for a 90 minute group psychotherapy session held at [facility].      Mental Status Exam:    Patient was pleasant and cooperative. The patient was oriented X4. The patient made good eye contact and was well dressed with good grooming and hygiene. Recent and remote memories were intact. Concentration and focus was normal. Speech (tone, volume, and rate) were intact. Mood and affect were congruently anxious but appropriate for the content of the session. Fund of knowledge was normal. Thought process was mostly logical and linear. Insight and judgment were intact. Patient denied SI, plan and/or means. Pt continues to be motivated for treatment.      Objective:    Patient was able to participate and benefit from treatment as evidenced by the patient s verbal expression and understanding of idea discussed.      New Symptoms or Complaints: The patient did not disclose any new symptoms to the undersigned.      Reason Patient is participating in the group: The group session was necessary for the care of the patient to address how symptoms impact the patient s depression symptoms and overall well-being.       Patient's response to current intervention: Patient was receptive of feedback given, supportive of other group members and appears to have benefited from the group process. No barriers to learning evidenced.       Progress toward short-term goals: Patient attended group session and actively participated in group.      Patient's Impressions: The patient indicated readiness to learn by the patient s choice to attend group.      Review of long term goals: See treatment plan developed by outpatient  counselor.     Are there any new goals: There were no new goals brought to the undersigned s attention.       The patient completed today s group session for the processing of Mindfulness Based Cognitive Therapy Group content and interventions performed in this session: Check-in, the connection between thoughts, feelings, and behaviors. Group members participated in activities including body scan and mindfulness.     Patient education on mindfulness and depression during this session. The patient was given an opportunity to ask questions and participate in the group discussion. The patient exhibited individual understanding by asking relevant questions and making appropriate comments to other group members.        Assessment:  1. PETE (generalized anxiety disorder)    2. Moderate alcohol use disorder      No indication of SI, plan and/or means.      Providers Impression of Current Status:   Patient participated actively in today's group therapy session and appears to have learned the impact of one s thinking on feelings and behaviors. Patient will continue to benefit from ongoing psychotherapy that uses CBT and MI.      Psychotherapeutic Techniques: A cognitive behavioral modality was used.      PLAN:    Follow-up: Continue to attend groups to address mental health symptom(s)      Discharge Criteria Planning: Alleviation of mental health symptoms.      Encounter performed and documented by Josy Hyatt MA, [facility]

## 2021-06-09 NOTE — PROGRESS NOTES
Mental Health Visit Note    3/15/17    Start time: 1530    Stop Time: 1620  Session # 2    Ramírez Whittaker is a 51 y.o. male is being seen today for a follow-up psychotherapy appointment    Chief Complaint   Patient presents with      Follow Up   .     New symptoms or complaints:  None    Functional Impairment:   The patient identifies the how daily stressors affect daily functioning in today's session as follows (Scale is 1-4, 1=not at all or rarely; 4=extremely so/everyday.)    Personal: 1  Family: 1  Social: 1  Work: 1    Clinical assessment of mental status:   Ramírez Whittaker presented on time.   He was oriented x3, open and cooperative, and dressed appropriately for this session and weather. His memory was Normal cognitive functioning .  His speech was  Within normal.  Language was normal.  Concentration and focus is Within normal. Psychosis is not noted or reported. He reports his mood is euthymic.  Affect is congruent with speech and is euthymic  Fund of knowledge is adequate. Insight is adequate for therapy.     Suicidal/Homicidal Ideation present:   No,  Patient denies suicidal and homicidal ideations/means or plans.      Patient's impression of their current status:  Patient indicates that he has not much alcohol in several weeks, though he had three beers total in the past week or so.  He notes  that his anxiety has improved from when he first arrived in therapy.  Patient indicates work and home life are going well, with some stress at work that is typical for this time of the year given the nature of his job.  Patient indicates he is not interested in continuing with the MBCT courses due to difficulties with scheduling.    Therapist impression of patient's current state:   Ramírez Whittaker presents with anxiety and alcohol use disorder. We strategized today about the end of our sessions and how patient can prepare for life without naltrexone--ie what he wants the role of alcohol to be in his life  during that transition, before it, and afterwards.  Discussed research suggesting that moderation is not likely to be a successful outcome, suggested patient weigh pros and cons of abstience versus attempted moderation, made plan to follow up with me in weeks prior to naltrexone discontinuation to firm up his plans and goals for alcohol use.    Type of psychotherapeutic technique provided:   CBT , MI    Progress toward short term goals: Progress as expected, patient resolving ambivalence about alcohol use.    Review of long term goals: Not done at today's visit ..    Diagnosis:   1. PETE (generalized anxiety disorder)    2. Moderate alcohol use disorder    Improving    Plan and Follow-up:   Patient will attempt to remain abstinent from alcohol use.  Patient will be compliant with medications and attend appointments as scheduled.      Discharge Criteria/Planning: Patient will continue with follow-up until therapy can be discontinued without return of signs and symptoms.    Signatures:   Performed and documented by TIFFANIE Barney, LICSW

## 2021-06-09 NOTE — PROGRESS NOTES
Subjective   Chief Complaint:  Medication Refill (naltrexone)    HPI:   Ramírez Whittaker is a 51 y.o. male who presents for medication check.     He started on naltrexone 1/1 for alcohol abuse.  Previously having 6-8 drinks/day. States he has now been abstinent from alcohol for 2.5 weeks.  He is tolerating the medication okay.  Previously having more intense headaches (which he also experiences with lactose), however headaches have subsided in terms of frequency and intensity.  Had communicated through [employer] about possibility of switching to acamprosate, however he states he would like to continue on the naltrexone as it has been effective thus far.      Has been following with outpatient addiction counselor, plans for 2-3 more sessions.  He states his viewpoint of sobriety has changed, now he feels this is something that is achievable for him and a lifestyle he could maintain.      Anxiety has decreased. No longer experiencing daily.      PMH:   Patient Active Problem List   Diagnosis     Mild intermittent asthma     Allergy To Pollens Trees     Hyperlipidemia     Generalized Anxiety Disorder     Sinus Bradycardia     Alcohol abuse       Past Medical History:   Diagnosis Date     Asthma        Current Medications:   Current Outpatient Prescriptions on File Prior to Visit   Medication Sig Dispense Refill     albuterol (VENTOLIN HFA) 90 mcg/actuation inhaler Inhale 2 puffs every 6 (six) hours as needed for wheezing. 3 Inhaler 03     ascorbic acid (ASCORBIC ACID WITH KOURTNEY HIPS) 500 MG tablet Take 500 mg by mouth daily.       cholecalciferol, vitamin D3, 1,000 unit tablet Take 1,000 Units by mouth daily.       EPINEPHrine (EPIPEN) 0.3 mg/0.3 mL atIn Inject into the shoulder, thigh, or buttocks once.       fexofenadine (ALLEGRA) 180 MG tablet Take 180 mg by mouth daily.       fluticasone (FLONASE) 50 mcg/actuation nasal spray USE ONE SPRAY IN EACH NOSTRIL ONE TIME DAILY 48 g 2     L.acidophilus-Bif. animalis  "(PROBIOTIC) 5 billion cell CpSP Take 1 capsule by mouth daily.       naltrexone (DEPADE) 50 mg tablet Take 1 tablet (50 mg total) by mouth daily. 30 tablet 1     fluticasone-salmeterol (ADVAIR DISKUS) 250-50 mcg/dose DISKUS Inhale 1 puff 2 (two) times a day. 180 puff 3     No current facility-administered medications on file prior to visit.        Allergies:  is allergic to milk and nuts - unspecified.    SH/FH:  Social History and Family History reviewed and updated.   Tobacco Status:  He  reports that he has never smoked. He has never used smokeless tobacco.    Review of Systems:  A complete head to toe ROS is negative unless otherwise noted in HPI    Objective     Vitals:    03/16/17 1000   BP: 92/62   Patient Site: Left Arm   Patient Position: Sitting   Cuff Size: Adult Large   Pulse: (!) 52   Weight: 163 lb (73.9 kg)   Height: 6' 2.25\" (1.886 m)     Wt Readings from Last 3 Encounters:   03/16/17 163 lb (73.9 kg)   01/24/17 170 lb (77.1 kg)   11/30/16 177 lb (80.3 kg)       Physical Exam:  GENERAL: Alert, cooperative, well-appearing male .   PSYCH: Pleasant mood, affect appropriate.  Good judgment and insight.  Intact recent and remote memory.  Good eye contact.    Labs:    No results found for this or any previous visit (from the past 168 hour(s)).    Assessment & Plan   1. Alcohol abuse: Discussed minimum length of treatment, he is unsure how he feels about tapering off of the medication in three months.  Also discussed possibility of more long-term use but lack of research on efficacy and safety.  Will recheck in three months, sooner if medication not tolerated or anxiety worsens.   - naltrexone (DEPADE) 50 mg tablet; Take 1 tablet (50 mg total) by mouth daily.  Dispense: 30 tablet; Refill: 3    Maya Stratton CNP    "

## 2021-06-09 NOTE — PROGRESS NOTES
Mental Health Visit Note    3/15/17    Start time: 1530    Stop Time: 1620  Session # 2    Ramírez Whittaker is a 51 y.o. male is being seen today for a follow-up psychotherapy appointment    Chief Complaint   Patient presents with      Follow Up   .     New symptoms or complaints:  None    Functional Impairment:   The patient identifies the how daily stressors affect daily functioning in today's session as follows (Scale is 1-4, 1=not at all or rarely; 4=extremely so/everyday.)    Personal: 1  Family: 1  Social: 1  Work: 1    Clinical assessment of mental status:   Ramírze Whittaker presented on time.   He was oriented x3, open and cooperative, and dressed appropriately for this session and weather. His memory was Normal cognitive functioning .  His speech was  Within normal.  Language was normal.  Concentration and focus is Within normal. Psychosis is not noted or reported. He reports his mood is euthymic.  Affect is congruent with speech and is euthymic  Fund of knowledge is adequate. Insight is adequate for therapy.     Suicidal/Homicidal Ideation present:   No,  Patient denies suicidal and homicidal ideations/means or plans.      Patient's impression of their current status:  Patient indicates that he has not had any alcohol to drink in 2.5 weeks and that he continues to note some decreased overall stress as a result of this.  He notes that he drinks non-alcoholic beer at night and usually doesn't finish even one bottle of this.  Reports that he can envision himself becoming someone who doesn't drink in the future, or at least contemplate it as a possibility, whereas before it seemed impossible to him.    Therapist impression of patient's current state:   Ramírez Whittaker presents with anxiety and alcohol use disorder.  I applauded his success in reducing/eliminating alcohol use.  He notes that he isn't sure he is totally on board with being abstinent from alcohol, and endorses the ideal of being able to drink  in moderation in social settings while also voicing the awareness that this might not be possible for him.  In the spirit of MI we explored his values around drinking and how drinking enhances/interferes with his life.    Type of psychotherapeutic technique provided:   CBT , MI    Progress toward short term goals:Progress as expected, tx plan not yet developed    Review of long term goals: Not done at today's visit ..    Diagnosis:   1. PETE (generalized anxiety disorder)    2. Moderate alcohol use disorder    Improving    Plan and Follow-up:   Patient will attempt to remain abstinent from alcohol use.  Patient will be compliant with medications and attend appointments as scheduled.  Patient will attend MBCT group this Thursday.    Discharge Criteria/Planning: Patient will continue with follow-up until therapy can be discontinued without return of signs and symptoms.    Signatures:   Performed and documented by TIFFANIE aBrney, LICSW

## 2021-06-10 NOTE — TELEPHONE ENCOUNTER
I recommend pt make a f/u visit to discuss medication.  [facility]

## 2021-06-10 NOTE — TELEPHONE ENCOUNTER
RN cannot approve Refill Request    RN can NOT refill this medication med is not covered by policy/route to provider     . Last office visit: 9/16/2019 Maya Stratton CNP Last Physical: Visit date not found Last MTM visit: Visit date not found Last visit same specialty: 9/16/2019 Maya Stratton CNP.  Next visit within 3 mo: Visit date not found  Next physical within 3 mo: Visit date not found      Alona Cedeño, [facility] Triage/Med Refill 7/29/2020    Requested Prescriptions   Pending Prescriptions Disp Refills     naltrexone (DEPADE) 50 mg tablet [Pharmacy Med Name: NALTREXONE 50 MG TABLET] 90 tablet 0     Sig: TAKE 1 TABLET BY MOUTH EVERY DAY       There is no refill protocol information for this order

## 2021-06-11 NOTE — PROGRESS NOTES
"Ramírez Whittaker is a 54 y.o. male who is being evaluated via a billable telephone visit.      The patient has been notified of following:     \"This telephone visit will be conducted via a call between you and your physician/provider. We have found that certain health care needs can be provided without the need for a physical exam.  This service lets us provide the care you need with a short phone conversation.  If a prescription is necessary we can send it directly to your pharmacy.  If lab work is needed we can place an order for that and you can then stop by our lab to have the test done at a later time.    Telephone visits are billed at different rates depending on your insurance coverage. During this emergency period, for some insurers they may be billed the same as an in-person visit.  Please reach out to your insurance provider with any questions.    If during the course of the call the physician/provider feels a telephone visit is not appropriate, you will not be charged for this service.\"    Patient has given verbal consent to a Telephone visit? Yes    What phone number would you like to be contacted at? [phone]    Patient would like to receive their AVS by AVS Preference: [place].    Additional provider notes: see notes     Problem List Items Addressed This Visit     Mild intermittent asthma     Advair 1 puff two times a day  Rinse    Triggers :   \"well controlled long time\"  Exercise + Cold weather     Albuterol Not used in years              Allergy to tree nuts     I have a lot of allergies    Nuts  Trees     Epipen            Alcohol abuse     Naltrexone 50 mg Daily   No side effects  Strange Dreams >> improved   No other downside     Alcohol Abuse    COntinue              Other Visit Diagnoses     Allergy to nuts    -  Primary    Relevant Medications    EPINEPHrine (EPIPEN/ADRENACLICK/AUVI-Q) 0.3 mg/0.3 mL injection    Heavy alcohol use        Relevant Medications    naltrexone (DEPADE) 50 mg " tablet          Assessment/Plan:  1. Heavy alcohol use  Currently doing well with no Trexall no side effects  Is not completely sober  Encourage consideration for obtaining a an ultrasound of the liver to exclude a fatty liver  Be mindful of interactions with medication such as Tylenol  - naltrexone (DEPADE) 50 mg tablet; Take 1 tablet (50 mg total) by mouth daily.  Dispense: 90 tablet; Refill: 3    2. Alcohol abuse  As above    3. Mild intermittent asthma without complication  Currently on Advair doing well completely controlled  Triggers cold air and exercise      4. Allergy to nuts  Does not desire further work-up  - EPINEPHrine (EPIPEN/ADRENACLICK/AUVI-Q) 0.3 mg/0.3 mL injection; Inject 0.3 mL (0.3 mg total) into the shoulder, thigh, or buttocks once for 1 dose.  Dispense: 2 Pre-filled Pen Syringe; Refill: 12    5. Allergy to tree nuts  Desire further work-up    We talked about taking an albuterol inhaler on long trips or being in remote places  Consider PSA screening yearly for prostate cancer  Next colonoscopy due in 5 years from last visit        Phone call duration: 18  minutes    Roberto Nuñez MD

## 2021-06-11 NOTE — PATIENT INSTRUCTIONS - HE
Doing well on naltrexone    Continue Advair 250/50 1 puff twice daily  Consider albuterol inhaler for long trips    Consider PSA screening for prostate cancer  Consider liver ultrasound to outline if fatty liver    Adult Physical AVS      Thank you for scheduling and completing a Physical Check up!      There has been suggestions that this is an Unnecessary part of the current care for patients by some. I do not agree!    It is a reminder to take stock in an overall health plan.     It also hopefully will engage dialogue about wellness and focusing on measure to stay well and fit, hopefully avoiding extra trips to see us!      -----------Wellness Pillars-----------    1. Nutrient Dense Nutrition-- we are or will become and are activated by what we eat! It is paramount that we all focus on eating well. This means trying to eat ?hole Foods,  single ingredient foods that nourish and activate our bodies. Food that are high in nutrients help run our bodies in a way that canchola and can transform our health and help us to heal and repair. Major groups include. Fats----preferably plant based. These help ours brains and nervous system. Proteins---from plants and animals. Nuts, Beans and Animal proteins. These help our musculoskeletal system. Complex Carbohydrates---fresh fruits vegetables, and whole grains. In order to maintain balance, we must give our bodies balanced nutrition. There are things we should avoid. Most processed foods and all added sugar should be avoided. If you do not prepare your own food, order simple foods a la carte. Order a protein and pair it with a side of vegetables. Vegetable should occupy more than half of your plate. Try to avoid simple carbohydrates like chips or fries.   2. Restorative Sleep----we all need sleep to allow our bodies to heal and repair. Sleep is indispensable and necessary. Good quality sleep is different from the simple quantitative amount of sleep. It is possible to sleep  without getting any rest or restorative sleep. This is why we ask about refreshing sleep, because it is possible to sleep and not have restorative sleep. If your sleep is not refreshing, you may requires a visit with a Sleep Specialist to help sort this out. For quantity, aim for 8-10 hours daily.   3. Movement---exercise has numerous health benefits. Bottom line, in order to do the things we do in life, it is necessary to condition, nurture and repair our machine. Food provides the tools and the basic repair structure and vital nutrients. Rest allows time and focus for repair and restoration. Exercise conditions and activated reparative mechanisms. I would suggest thinking about one's high school weight as a rule and aim for a weight plus 15 pounds for men and 10 pounds for women as a goal. Also we should strive to engage in intentional activity 3 to 4 days of cardio fitness 30-60 minutes and 1-3 days of strength training. We want to be fit as we age and have stamina to do activities of daily living and beyond. Walk, bike, swim, run, box, dance, and so on, find your thing! The benefits are incredible! Exercise lowers blood pressure, helps treat and prevent diabetes and helps us live longer and in a way that is more satisfying. As Eric says,  Just Do It!  4. Mindfulness----get in touch with your mind body connection. Take time daily to reflect on and be cognizant of how you are doing----eating, working, parenting, interacting with loved ones, friends and others. Be intentional and present in relating to things in which you are engaged.     Preventative Care    Colon Cancer screening. It may not be glamorous, but it saves lives and may save yours. There is colonoscopy and immuno-chemical testing. Pick your mode , but get it done. If there is a first degree relative that has had Colon Cancer, we may recommend screening 10 years before the age of that index case.     Breast Cancer Screening. This is mostly for women.  Get to know the architecture of your breasts. If it makes you feel more comfortable, engage your partner as well. If something strikes you as ?ot right,  get it checked out. Mammogram breast cancer screening does detect cancers. Self breast exams can detect cancers. Guidelines vary but in general start screen in adulthood for self exams and mammography in your 40s. If there is a first degree relative or many with cancers, this may be earlier or involve genetic screening.     Pap Smears Cervical Cancer Screening---again women. Start screening after sexual activity or intercourse begins. Cervical cancer really is tied to exposure to HPV virus and this is related to sexual intercourse. Cervical cancer is treatable and preventable, as Pap smears should detect changes BEFORE cancerous transformation.   All women that have a cervix should be screen between 21-65 as a rule. Intervals vary based on age and medical history.     Vaccines. Yes there is controversy. But I still think vaccines save lives and reduce disease burden in our human populations. Please consider getting vaccinated as you are due for Vaccines.     About Vitamins and Supplements     I do recommend that adult and kids consider a multivitamin as way to enrich our nutrition.     A solid multivitamin. Ask one of us for recommendations. Our pharmacy carry some high quality lower cost vitamins that we have recommends.     [place]lin 3/6 Oils Fish Oils, Flax Oils, Krill Oils, Algea Oils, and Cumin Seed and Borage Oils are among the Omega 3s and 6s. Good oils nourish our nervous system and engage our immune system in positive ways.     Vitamin D. If you are in [place]. You probably need some. We shoot for a level of 50-60. So sometime this takes staring lower and titrating up a supplement. Start at 2820-2876 IU wit a fatty meal and check levels.             Certain supplements may help with our gut's immune system or Microbiome.   Start with plants, many and of  diverse colors.   Consider cultured foods with live active bacteria. Examples are Yogurt, Kefir and Kombucha.     Eat Prebiotic foods from the Chicori family, asparagus, bananas, inulin fibers and more.     Other supplements that may help are Zinc Carnosine, D-limonene, Vitamin D and Colostrum.     It may prudent to try the Elimination Diet and eliminate certain foods such as Wheat products, Dairy Products and Especially Refined Sugars.       We are screening multiple issues:     High Blood Pressure.   We ideally have readings less than 130 on the top number and less than 80 on the bottom.     Diabetes.   Diabetes is the body not processing sugars in an efficient way. When sugars are not dealt with in an efficient manner, every part of the body can be effected, the heart such as a heart attack or failure, the brain such as a stroke and really any part of the body. Insulin levels being overworked really drives diabetes. Lowering intake of simple sugar and exercising are two major ways to treat and stave off Diabetes.     Heart Disease:  Heart issues usually arise out of a combination of genetic issues and life stressors and choices.   Focusing on the Pillars of Wellness are ways to help prevent heart disease. Avoiding tobacco, limiting alcohol intake to moderate intake and addressing out  of balance cholesterol, presence of diabetes and persistently elevated blood pressure may require medications in addition to life style changes.     Skin cancer is typically due to cumulative Sun Damage. There are other genetic factor as well. If skin looks irritated or a mole changes color, shape, size or has irregular borders, get it checked out. Skin exams can also save lives.     If you are Diabetic or Have Known Heart Disease. We have goals for your best Care     A1C. For Diabetics     This is a measure of how well controlled your sugars are over the last 3 months. In general, we would like the percent number less than 7% for  most diabetics. In the morning and before all meals the sugar number should be less than 150. If you are diabetic and this is the case, you are managing well. The Pillars of Wellness are still a guide to keeping your body in balance.     Blood Pressure for Diabetics and Heart Disease     Top < 130 Bottom < 80  This goal of blood pressure control reduces Diabetes complications, such as stroke or heart attack. Life style first and add medication if consistently high.     No Tobacco. For Anyone!    Smoking tobacco or chewing produces by products that are particularly harmful to Diabetics, but really all of us. I implore you to not use tobacco products.     Aspirin for Heart Disease Patient     If you have high blood pressure, stroke or heart disease risk, you probably would benefit from a baby aspirin. There reasons to avoid aspirin such as allergies, risks for bleeding, etc. have the discussion of aspirin should be part of your therapy.     Statin Medications for Heart Disease, Vascular Disease or High Risk Patients    These medicine have statistically been shown to benefit Diabetic patients, especially those with disordered cholesterol profiles. I like to do an NMR panel that shows Atherogenic Risk (Stroke/ Heart Attack) and Insulin Resistance. Likely this may be recommended as part of you treatment plan.

## 2021-06-12 ENCOUNTER — HEALTH MAINTENANCE LETTER (OUTPATIENT)
Age: 56
End: 2021-06-12

## 2021-06-14 NOTE — TELEPHONE ENCOUNTER
RN cannot approve Refill Request    RN can NOT refill this medication Protocol failed and NO refill given. Last office visit: 9/16/2019 Maya Stratton CNP Last Physical: Visit date not found Last MTM visit: Visit date not found Last visit same specialty: 9/16/2019 Maya Stratton CNP.  Next visit within 3 mo: Visit date not found  Next physical within 3 mo: Visit date not found      Barbara Blakely, [facility] Triage/Med Refill 1/3/2021    Requested Prescriptions   Pending Prescriptions Disp Refills     ADVAIR DISKUS 250-50 mcg/dose DISKUS [Pharmacy Med Name: ADVAIR 250-50 DISKUS]  2     Sig: TAKE 1 PUFF BY MOUTH TWICE A DAY       There is no refill protocol information for this order

## 2021-06-20 NOTE — LETTER
Letter by Roberto Nuñez MD at      Author: Roberto Nuñez MD Service: -- Author Type: --    Filed:  Encounter Date: 8/31/2020 Status: (Other)       My Asthma Action Plan     Name: Ramírez Whittaker   YOB: 1965  Date: 8/31/2020   My doctor: Roberto Nuñez MD   My clinic: [facility]         My Rescue Medicine:     Advair 250 / 50  1 puff two times a day     Call for Albuterol if needed   Albuterol (Proair/Ventolin/Proventil HFA) 2-4 puffs EVERY 4 HOURS as needed. Use a spacer if recommended by your provider.   My Asthma Severity:   Mild Persistent  Know your asthma triggers: exercise or sports and cold air             GREEN ZONE   Good Control    I feel good    No cough or wheeze    Can work, sleep and play without asthma symptoms     Take your asthma control medicine every day.     1. If exercise triggers your asthma, take your rescue medication    15 minutes before exercise or sports, and    During exercise if you have asthma symptoms  2. Spacer to use with inhaler: If you have a spacer, make sure to use it with your inhaler             YELLOW ZONE Getting Worse  I have ANY of these:    I do not feel good    Cough or wheeze    Chest feels tight    Wake up at night 1. Keep taking your Green Zone medications  2. Start taking your rescue medicine:    every 20 minutes for up to 1 hour. Then every 4 hours for 24-48 hours.  3. If you stay in the Yellow Zone for more than 12-24 hours, contact your doctor.  4. If you do not return to the Green Zone in 12-24 hours or you get worse, start taking your oral steroid medicine if prescribed by your provider.           RED ZONE Medical Alert - Get Help  I have ANY of these:    I feel awful    Medicine is not helping    Breathing getting harder    Trouble walking or talking    Nose opens wide to breathe     1. Take your rescue medicine NOW  2. If your provider has prescribed an oral steroid medicine, start taking it NOW  3. Call your doctor  NOW  4. If you are still in the Red Zone after 20 minutes and you have not reached your doctor:    Take your rescue medicine again and    Call 911 or go to the emergency room right away    See your regular doctor within 2 weeks of an Emergency Room or Urgent Care visit for follow-up treatment.          Annual Reminders:  Meet with Asthma Educator,  Flu Shot in the Fall, consider Pneumonia Vaccination for patients with asthma (aged 19 and older).    Pharmacy:   [facility] #[id number] - [place], [place] - [address]  [address] [place] [address]  Phone: [phone] Fax: [phone]    [facility] [id number] - [place], [place] - [address]  [address] [place] [address]  Phone: [phone] Fax: [phone]      Electronically signed by Roberto Nuñez MD   Date: 08/31/20                      Asthma Triggers  How To Control Things That Make Your Asthma Worse    Triggers are things that make your asthma worse.  Look at the list below to help you find your triggers and what you can do about them.  You can help prevent asthma flare-ups by staying away from your triggers.      Trigger                                                          What you can do   Cigarette Smoke  Tobacco smoke can make asthma worse. Do not allow smoking in your home, car or around you.  Be sure no one smokes at a arjun day care or school.  If you smoke, ask your health care provider for ways to help you quit.  Ask family members to quit too.  Ask your health care provider for a referral to Quit Plan to help you quit smoking, or call 2-817-359-PLAN.     Colds, Flu, Bronchitis  These are common triggers of asthma. Wash your hands often.  Dont touch your eyes, nose or mouth.  Get a flu shot every year.     Dust Mites  These are tiny bugs that live in cloth or carpet. They are too small to see. Wash sheets and blankets in hot water every week.   Encase pillows and mattress in dust mite proof covers.  Avoid having  carpet if you can. If you have carpet, vacuum weekly.   Use a dust mask and HEPA vacuum.   Pollen and Outdoor Mold  Some people are allergic to trees, grass, or weed pollen, or molds. Try to keep your windows closed.  Limit time out doors when pollen count is high.   Ask you health care provider about taking medicine during allergy season.     Animal Dander  Some people are allergic to skin flakes, urine or saliva from pets with fur or feathers. Keep pets with fur or feathers out of your home.    If you cant keep the pet outdoors, then keep the pet out of your bedroom.  Keep the bedroom door closed.  Keep pets off cloth furniture and away from stuffed toys.     Mice, Rats, and Cockroaches  Some people are allergic to the waste from these pests.   Cover food and garbage.  Clean up spills and food crumbs.  Store grease in the refrigerator.   Keep food out of the bedroom.   Indoor Mold  This can be a trigger if your home has high moisture. Fix leaking faucets, pipes, or other sources of water.   Clean moldy surfaces.  Dehumidify basement if it is damp and smelly.   Smoke, Strong Odors, and Sprays  These can reduce air quality. Stay away from strong odors and sprays, such as perfume, powder, hair spray, paints, smoke incense, paint, cleaning products, candles and new carpet.   Exercise or Sports  Some people with asthma have this trigger. Be active!  Ask your doctor about taking medicine before sports or exercise to prevent symptoms.    Warm up for 5-10 minutes before and after sports or exercise.     Other Triggers of Asthma  Cold air:  Cover your nose and mouth with a scarf.  Sometimes laughing or crying can be a trigger.  Some medicines and food can trigger asthma.

## 2021-06-23 NOTE — PROGRESS NOTES
Assessment & Plan   1. Alcohol abuse  Currently heavy alcohol use with 4-6 beers a day.  Has not been successful in cutting back on his own.  Discussed that naltrexone is best used in the setting of treatment program that also addresses the triggers behind the drinking.  He is not interested in a referral for chemical dependency or individual therapy.  He would simply like to start the naltrexone again.  Advised on possible side effects.  Will check liver function today and recheck again in 1 month at follow-up to see how he is doing.  - naltrexone (DEPADE) 50 mg tablet; Take 1 tablet (50 mg total) by mouth daily.  Dispense: 30 tablet; Refill: 1  - Hepatic Profile    2. Mild intermittent asthma, uncomplicated  Well-controlled with daily Advair.  - fluticasone-salmeterol (ADVAIR DISKUS) 250-50 mcg/dose DISKUS; Inhale 1 puff 2 (two) times a day.  Dispense: 180 puff; Refill: 2  - albuterol (VENTOLIN HFA) 90 mcg/actuation inhaler; Inhale 2 puffs every 6 (six) hours as needed for wheezing.  Dispense: 3 Inhaler; Refill: 3    3. Flat feet, bilateral  Discomfort likely associated to flat arches.  Referral to podiatry for evaluation and management.  - Ambulatory referral to Podiatry    4. Allergy to nuts    - EPINEPHrine (EPIPEN) 0.3 mg/0.3 mL injection; Inject 0.3 mL (0.3 mg total) into the shoulder, thigh, or buttocks once for 1 dose.  Dispense: 2 Pre-filled Pen Syringe; Refill: 2    5. Need for Tdap vaccination    - Tdap vaccine,  8yo or older,  IM    Maya Stratton CNP    Subjective   Chief Complaint:  Establish Care; Medication Refill; and Pain (both feet)    HPI:   Ramírez Whittaker is a 53 y.o. male who presents for medication check.   PMH notable for alcohol dependency, PETE, mild intermittent asthma, hyperlipidemia.     Asthma: Well controlled with Advair which he admits he is only taking once a day.  Very rare use of albuterol.  He has not experienced any exacerbations in the last year.    Alcohol use: Previously  prescribed naltrexone and did do some outpatient alcohol abuse treatment.  Today he states he would like to consider restarting naltrexone.  Alcohol use has slowly increased again.  He is now consuming 4-6 beers a day.  He has not had any days without alcohol in many months.  He states he thinks frequently about cutting back or not drinking though ultimately when he gets to the end of the day he again drinks.  He does believe anxiety is better controlled than it was previously when he sought outpatient therapy and individual therapy.  He is not interested in any formal therapy at this time.  He would simply like to start the naltrexone again.    Feet: Bilateral pain plantar aspect of feet.  Midfoot.  Flat arches.  He has tried over-the-counter insoles without improvement.  He previously was a runner and ran several times a week.  He is now not able to do so due to the discomfort.    Allergies:  is allergic to milk and nuts - unspecified.    SH/FH:  Social History and Family History reviewed and updated.   Tobacco Status:  He  reports that  has never smoked. he has never used smokeless tobacco.    Review of Systems:  A complete head to toe ROS is negative unless otherwise noted in HPI    Objective   There were no vitals filed for this visit.    Physical Exam:  GENERAL: Alert, well-appearing male .   CV: Regular rate and rhythm without murmurs, rubs or gallops.  RESP: Lung sounds clear, symmetric excursion.

## 2021-06-23 NOTE — PATIENT INSTRUCTIONS - HE
Please call one of the [facility] locations below to schedule an appointment. If you received a prescription please bring it with you to your appointment. Some locations are limited to what they carry.    Office Locations    [facility]  [address] [place] [address]  Home Medical Equipment, [address]   Phone: [phone]   Orthotics and Prosthetics, [address]   Phone: [phone]    [facility]  Home Medical Equipment  [address], [address], [place] [address]   Phone: [phone]    Orthotics and Prosthetics ([facility])    [address], [address], [place] [address]  Phone: [phone]    [facility] [place]  [address]  [address]   [place] [address]   Phone: [phone]    [facility]ld[facility]  [address]  [place] [address]  Phone: [phone]    [facility] Bld[facility]   [address]  [place] [address]  Phone: [phone]    [facility] [place] [facility]  [address]  [place] [address]  Phone: [phone]    [facility]  [address]  [address]  [place] [address]  Phone: [phone]    [place]   [address] [place] [address]   Phone: [phone]

## 2021-06-23 NOTE — PROGRESS NOTES
FOOT AND ANKLE SURGERY/PODIATRY CONSULT NOTE         ASSESSMENT:   Pronation deformity bilateral feet      TREATMENT:  I have recommended orthotics.        HPI: I was asked to see Ramírez EVENS Remedios today to evaluate and treat arch pain in both feet.  The patient stated that he has had arch pain for several years but over the past several months the pain has increased.  He has tried over-the-counter shoe inserts which have given him some moderate relief.  The patient stated that he has no rest pain.  The pain is only aggravated with weightbearing and ambulation.  He denies any other previous treatment he denies any trauma to his feet.  He has not had any associated redness or swelling with the pain.  The patient stated that the pain is located in the arches of both feet.  The patient was referred to podiatry by Maya Ferreira M.D.  Past Medical History:   Diagnosis Date     Asthma        Past Surgical History:   Procedure Laterality Date     WISDOM TOOTH EXTRACTION         Allergies   Allergen Reactions     Milk      Nuts - Unspecified Anaphylaxis         Current Outpatient Medications:      albuterol (VENTOLIN HFA) 90 mcg/actuation inhaler, Inhale 2 puffs every 6 (six) hours as needed for wheezing., Disp: 3 Inhaler, Rfl: 3     ascorbic acid (ASCORBIC ACID WITH KOURTNEY HIPS) 500 MG tablet, Take 500 mg by mouth daily., Disp: , Rfl:      cholecalciferol, vitamin D3, 1,000 unit tablet, Take 1,000 Units by mouth daily., Disp: , Rfl:      EPINEPHrine (EPIPEN/ADRENACLICK/AUVI-Q) 0.3 mg/0.3 mL injection, Inject 0.3 mg into the shoulder, thigh, or buttocks once., Disp: , Rfl: 2     fexofenadine (ALLEGRA) 180 MG tablet, Take 180 mg by mouth daily., Disp: , Rfl:      fluticasone (FLONASE) 50 mcg/actuation nasal spray, USE ONE SPRAY IN EACH NOSTRIL ONE TIME DAILY, Disp: 48 g, Rfl: 2     fluticasone-salmeterol (ADVAIR DISKUS) 250-50 mcg/dose DISKUS, Inhale 1 puff 2 (two) times a day., Disp: 180 puff, Rfl: 2     L.acidophilus-Bif.  animalis (PROBIOTIC) 5 billion cell CpSP, Take 1 capsule by mouth daily., Disp: , Rfl:      naltrexone (DEPADE) 50 mg tablet, Take 1 tablet (50 mg total) by mouth daily., Disp: 30 tablet, Rfl: 1    Family History   Problem Relation Age of Onset     Valvular heart disease Brother      Heart disease Father      Crohn's disease Sister        Social History     Socioeconomic History     Marital status:      Spouse name: Not on file     Number of children: Not on file     Years of education: Not on file     Highest education level: Not on file   Social Needs     Financial resource strain: Not on file     Food insecurity - worry: Not on file     Food insecurity - inability: Not on file     Transportation needs - medical: Not on file     Transportation needs - non-medical: Not on file   Occupational History     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     Employer: [employer]     Comment: [employer]   Tobacco Use     Smoking status: Never Smoker     Smokeless tobacco: Never Used   Substance and Sexual Activity     Alcohol use: Yes     Drinks per session: 1 or 2     Binge frequency: Daily or almost daily     Drug use: No     Sexual activity: Yes     Partners: Female     Comment:    Other Topics Concern     Not on file   Social History Narrative     Not on file       Review of Systems - Patient denies fever, chills, rash, wound, stiffness, limping, numbness, weakness, heart burn, blood in stool, chest pain with activity, calf pain when walking, shortness of breath with activity, chronic cough, easy bleeding/bruising, swelling of ankles, excessive thirst, fatigue, depression, anxiety.      OBJECTIVE:  Appearance: alert, well appearing, and in no distress and normal appearing weight.    Vitals:    02/07/19 0814   BP: 90/60   Pulse: 64   Resp: 16   Temp: 98.2  F (36.8  C)       BMI= Body mass index is 23.08 kg/m .    General appearance: Patient is alert and fully cooperative with history & exam.  No sign of  distress is noted during the visit.     Psychiatric: Affect is pleasant & appropriate.  Patient appears motivated to improve health.     Respiratory: Breathing is regular & unlabored while sitting.     HEENT: Hearing is intact to spoken word.  Speech is clear.  No gross evidence of visual impairment that would impact ambulation.      Vascular: Dorsalis pedis and posterior tibial pulses are palpable. There is pedal hair growth bilaterally.  CFT < 3 sec from anterior tibial surface to distal digits bilaterally. There is no appreciable edema noted.  Dermatologic: Turgor and texture are within normal limits. No coloration or temperature changes. No primary or secondary lesions noted.  Neurologic: All epicritic and proprioceptive sensations are grossly intact bilaterally.  Musculoskeletal: All active and passive ankle, subtalar, midtarsal, and 1st MPJ range of motion are grossly intact without pain or crepitus, with the exception of none. Manual muscle strength is +5/5 bilaterally in all compartments. All dorsiflexors, plantarflexors, invertors, evertors are intact bilaterally.  No tenderness present to the medial longitudinal arch bilaterally on palpation.  No tenderness to bilateral feet with range of motion. Calf is soft/non-tender.  There is some flattening of the medial longitudinal arch noted bilaterally upon weightbearing.      TREATMENT:  I recommend orthotics    Ernesto Santiago; BETTE  [facility] Foot & Ankle Surgery/Podiatry

## 2021-06-24 NOTE — PROGRESS NOTES
Assessment & Plan   1. Heavy alcohol use  Has cut back significantly on alcohol use with addition of naltrexone.  Now within the range considered moderate intake for male.  He continues to experience cravings for alcohol though less so than prior to starting the medication.  Discussed the possibility of increasing to 50 mg to see if this would offer any more benefit.  He fears the potential of again experiencing daily headache with this.  He will contact me via my chart if he decides to increase the dose.  Again discussed today the benefit of complete cessation from alcohol and that ultimately if he is continuing to drink periodically he will likely experience increased cravings.  He does not desire to quit drinking socially at this time nor does he think it is a problem.  He declines any additional assistance with outpatient programs or individual therapy.  Will check hepatic panel and plan for medication recheck visit in 1 year.  Encouraged him to come back sooner if alcohol use again increases.  - Hepatic Profile  - naltrexone (DEPADE) 50 mg tablet; Take 0.5 tablets (25 mg total) by mouth daily.  Dispense: 45 tablet; Refill: 3    2. Screening for cholesterol level  Check fasting labs.  - Lipid Cascade    3. Screening for diabetes mellitu  - Glucose    4. Screening for deficiency anemia  - HM2(CBC w/o Differential)    Maya Stratton CNP    Subjective   Chief Complaint:  Follow-up    HPI:   Ramírez Whittaker is a 53 y.o. male who presents for follow-up    He was seen 1 month ago with concerns about increasing alcohol use.  Had previously participated in some outpatient treatment and taken naltrexone at that time.  He had stated alcohol use was increasing and now at the level of typically 4-6 beers a day.  We discussed treatment options at that time and he was averse to doing any further outpatient addiction therapy or individual therapy.  He was interested in restarting the naltrexone so did agree to that.  Hepatic  profile normal when starting.      He states since restarting the naltrexone he has again noted a significant improvement in cravings for alcohol.  At the time of starting the medication he also made the decision to eliminate all alcohol from the home.  He was previously drinking 4-6 drinks a night at home and is now no longer drinking any alcohol at home.  He has been drinking 1-2 drinks a night when he and his wife got for social engagements.  He estimates this is 3 times a week typically.  He continues to experience cravings for alcohol the last strongly than previously.  His wife has been supportive and not keeping alcohol in the house and helping to keep him accountable.  He did note headaches with initially starting the naltrexone at 25 mg though this improved after 2 weeks.  He decided to keep the dose stable at 25 mg for fear of headaches returning with increasing to 50 mg.  He continues on this dose.  He has not noted any other side effects.      Allergies:  is allergic to milk and nuts - unspecified.    SH/FH:  Social History and Family History reviewed and updated.   Tobacco Status:  He  reports that  has never smoked. he has never used smokeless tobacco.    Review of Systems:  A complete head to toe ROS is negative unless otherwise noted in HPI    Objective     Vitals:    03/11/19 0801   BP: 100/58   Patient Site: Left Arm   Patient Position: Sitting   Cuff Size: Adult Large   Pulse: (!) 56   Weight: 179 lb 8 oz (81.4 kg)       Physical Exam:  GENERAL: Alert, well-appearing male .   PSYCH: Pleasant mood, affect appropriate.  Good judgment and insight.  Intact recent and remote memory.  Good eye contact.  CV: Regular rate and rhythm without murmurs, rubs or gallops.  RESP: Lung sounds clear

## 2021-06-24 NOTE — PROGRESS NOTES
S: Pt seen at Main lab with Rx to fill for FOs. Patient reports foot pain when exercising. O: I see the Rx for the FOs due to Pain and pronation. I can confirm foot pain. Patient reports having no FOs currently in the shoes. A: Impressions were taken for Marathons full length with low met pads. P: We will see The patient when ready for delivery and fitting. G: The goal is to reduce midfoot motion.   Electronically signed Austin Tucker , LPO.

## 2021-06-24 NOTE — PATIENT INSTRUCTIONS - HE
Recommendations from today's visit                                                       1. If you decide to increase the naltrexone to 50mg, please send me a mychart and let me know so we can update your chart with your dose.  Additionally, we would want to recheck another liver function panel one month after increasing the dose.  This would be a lab only appointment    2. If everything continues to go well, we will plan to see you in one year for medication check.  If alcohol use increases, please return for a visit.     3. I will contact you via [facility] with your lab results.     Next appointment: one year     To reschedule your appointment, please call the clinic directly at [phone].   It was a pleasure seeing you today! I look forward to seeing you again.

## 2021-06-24 NOTE — PROGRESS NOTES
S: Pt seen at Main Lab eager to get the FO's we evaled for on the last visit. O: I see that they seemed to fit and function well and were reported to feel comfortable. A: Instructions were given and seemed to be understood. Patient was satisfied. P: LUCIEN PRN.   Electronically signed Austin Tucker CPO, LPO.

## 2021-07-03 NOTE — ADDENDUM NOTE
Addendum Note by Inez Hopson DPM at 2/7/2019  8:20 AM     Author: Inez Hopson DPM Service: -- Author Type: Physician    Filed: 2/7/2019  8:34 AM Encounter Date: 2/7/2019 Status: Signed    : Inez Hopson DPM (Physician)    Addended by: INEZ HOPSON on: 2/7/2019 08:34 AM        Modules accepted: Level of Service

## 2021-08-24 ENCOUNTER — VIRTUAL VISIT (OUTPATIENT)
Dept: FAMILY MEDICINE | Facility: CLINIC | Age: 56
End: 2021-08-24
Payer: COMMERCIAL

## 2021-08-24 DIAGNOSIS — Z13.21 ENCOUNTER FOR VITAMIN DEFICIENCY SCREENING: ICD-10-CM

## 2021-08-24 DIAGNOSIS — Z12.5 SCREENING FOR PROSTATE CANCER: ICD-10-CM

## 2021-08-24 DIAGNOSIS — F10.20 UNCOMPLICATED ALCOHOL DEPENDENCE (H): Primary | ICD-10-CM

## 2021-08-24 DIAGNOSIS — Z13.220 SCREENING FOR CHOLESTEROL LEVEL: ICD-10-CM

## 2021-08-24 DIAGNOSIS — J45.30 MILD PERSISTENT ASTHMA WITHOUT COMPLICATION: ICD-10-CM

## 2021-08-24 DIAGNOSIS — Z12.11 COLON CANCER SCREENING: ICD-10-CM

## 2021-08-24 DIAGNOSIS — Z13.0 SCREENING FOR DEFICIENCY ANEMIA: ICD-10-CM

## 2021-08-24 DIAGNOSIS — Z91.018 ALLERGY TO TREE NUTS: ICD-10-CM

## 2021-08-24 DIAGNOSIS — Z13.1 SCREENING FOR DIABETES MELLITUS: ICD-10-CM

## 2021-08-24 PROCEDURE — 99214 OFFICE O/P EST MOD 30 MIN: CPT | Mod: GT | Performed by: NURSE PRACTITIONER

## 2021-08-24 RX ORDER — NALTREXONE HYDROCHLORIDE 50 MG/1
50 TABLET, FILM COATED ORAL DAILY
Qty: 90 TABLET | Refills: 0 | Status: SHIPPED | OUTPATIENT
Start: 2021-08-24 | End: 2021-12-22

## 2021-08-24 RX ORDER — EPINEPHRINE 0.15 MG/.3ML
0.15 INJECTION INTRAMUSCULAR ONCE
Qty: 0.3 ML | Refills: 1 | Status: SHIPPED | OUTPATIENT
Start: 2021-08-24 | End: 2021-08-24

## 2021-08-24 RX ORDER — ALBUTEROL SULFATE 90 UG/1
2 AEROSOL, METERED RESPIRATORY (INHALATION) EVERY 6 HOURS PRN
Qty: 18 G | Refills: 1 | Status: SHIPPED | OUTPATIENT
Start: 2021-08-24 | End: 2023-01-25

## 2021-08-24 ASSESSMENT — ASTHMA QUESTIONNAIRES
QUESTION_1 LAST FOUR WEEKS HOW MUCH OF THE TIME DID YOUR ASTHMA KEEP YOU FROM GETTING AS MUCH DONE AT WORK, SCHOOL OR AT HOME: NONE OF THE TIME
QUESTION_3 LAST FOUR WEEKS HOW OFTEN DID YOUR ASTHMA SYMPTOMS (WHEEZING, COUGHING, SHORTNESS OF BREATH, CHEST TIGHTNESS OR PAIN) WAKE YOU UP AT NIGHT OR EARLIER THAN USUAL IN THE MORNING: NOT AT ALL
ACT_TOTALSCORE: 25
QUESTION_2 LAST FOUR WEEKS HOW OFTEN HAVE YOU HAD SHORTNESS OF BREATH: NOT AT ALL
QUESTION_4 LAST FOUR WEEKS HOW OFTEN HAVE YOU USED YOUR RESCUE INHALER OR NEBULIZER MEDICATION (SUCH AS ALBUTEROL): NOT AT ALL
QUESTION_5 LAST FOUR WEEKS HOW WOULD YOU RATE YOUR ASTHMA CONTROL: COMPLETELY CONTROLLED

## 2021-08-24 NOTE — PROGRESS NOTES
Ramírez is a 55 year old who is being evaluated via a billable video visit.      How would you like to obtain your AVS? MyChart  If the video visit is dropped, the invitation should be resent by: Text to cell phone: [phone]  Will anyone else be joining your video visit? No      Video Start Time: 11:59 AM    1. Uncomplicated alcohol dependence (H)  Continues to drink approximately six drinks a day and state he has been stable at this level for over a year.  He is unsure if naltrexone is helping with cravings any longer but is also feeling very hesitant to taper off of this as he fears he will drink more.  Counseled on health risks with long term heavy alcohol use and he states he is willing to try to cut down but unsure if he will be able to do it.  He declines any further assistance or referrals at this time.  Recheck hepatic profile. Continue on naltrexone for now.    - naltrexone (DEPADE/REVIA) 50 MG tablet; Take 1 tablet (50 mg) by mouth daily  Dispense: 90 tablet; Refill: 0  - Hepatic panel (Albumin, ALT, AST, Bili, Alk Phos, TP); Future    2. Mild persistent asthma without complication  Asthma well controlled on Advair.    - fluticasone-salmeterol (ADVAIR DISKUS) 250-50 MCG/DOSE inhaler; [ADVAIR DISKUS 250-50 MCG/DOSE DISKUS] TAKE 1 PUFF BY MOUTH TWICE A DAY  Dispense: 180 each; Refill: 2  - albuterol (PROAIR HFA/PROVENTIL HFA/VENTOLIN HFA) 108 (90 Base) MCG/ACT inhaler; Inhale 2 puffs into the lungs every 6 hours as needed  Dispense: 18 g; Refill: 1    3. Colon cancer screening  - Adult Gastro Ref - Procedure Only; Future    4. Allergy to tree nuts  - EPINEPHrine (EPIPEN JR) 0.15 MG/0.3ML injection 2-pack; Inject 0.3 mLs (0.15 mg) into the muscle once for 1 dose  Dispense: 0.3 mL; Refill: 1    5. Screening for cholesterol level  - Lipid Profile (Chol, Trig, HDL, LDL calc); Future    6. Screening for diabetes mellitus  - Basic metabolic panel  (Ca, Cl, CO2, Creat, Gluc, K, Na, BUN); Future    7. Screening  for deficiency anemia  - CBC with platelets; Future    8. Encounter for vitamin deficiency screening    9. Screening for prostate cancer  - PSA, screen; Future    Subjective   Ramírez is a 55 year old who presents for the following health issues     HPI     He has been well. Denies any changes to health this year.      Asthma;  Continues on Advair. Very well controlled.  Rare use of albuterol.     Alcohol use:  History of alcohol abuse.  Naltrexone 50mg. Has been on this medication for three years.  He states his alcohol use is 'the same'.  When asked to quantify he states typically 6 beers a day.  He will rarely drink more than that or less than that.  He denies drinking in the morning.  Others have not expressed concern.  He does endorse feelings of guilt.  He feels it will be difficult to cut back on his own though also is not interested in help.     HM:  Due colonoscopy, PSA    Review of Systems   Negative unless otherwise noted in HPI      Objective           Vitals:  No vitals were obtained today due to virtual visit.    Physical Exam   GENERAL: Healthy, alert and no distress  PSYCH: Mentation appears normal, affect normal/bright, judgement and insight intact, normal speech and appearance well-groomed.                Video-Visit Details    Type of service:  Video Visit    Video End Time:12:16 PM    Originating Location (pt. Location): Home    Distant Location (provider location):  [facility]     Platform used for Video Visit: [employer]

## 2021-08-25 ASSESSMENT — ASTHMA QUESTIONNAIRES: ACT_TOTALSCORE: 25

## 2021-09-17 ENCOUNTER — LAB (OUTPATIENT)
Dept: LAB | Facility: CLINIC | Age: 56
End: 2021-09-17
Payer: COMMERCIAL

## 2021-09-17 DIAGNOSIS — Z12.5 SCREENING FOR PROSTATE CANCER: ICD-10-CM

## 2021-09-17 DIAGNOSIS — Z13.220 SCREENING FOR CHOLESTEROL LEVEL: ICD-10-CM

## 2021-09-17 DIAGNOSIS — Z13.0 SCREENING FOR DEFICIENCY ANEMIA: ICD-10-CM

## 2021-09-17 DIAGNOSIS — F10.20 UNCOMPLICATED ALCOHOL DEPENDENCE (H): ICD-10-CM

## 2021-09-17 DIAGNOSIS — Z13.1 SCREENING FOR DIABETES MELLITUS: ICD-10-CM

## 2021-09-17 LAB
ALBUMIN SERPL-MCNC: 4.1 G/DL (ref 3.5–5)
ALP SERPL-CCNC: 57 U/L (ref 45–120)
ALT SERPL W P-5'-P-CCNC: 28 U/L (ref 0–45)
ANION GAP SERPL CALCULATED.3IONS-SCNC: 10 MMOL/L (ref 5–18)
AST SERPL W P-5'-P-CCNC: 22 U/L (ref 0–40)
BILIRUB DIRECT SERPL-MCNC: 0.3 MG/DL
BILIRUB SERPL-MCNC: 1 MG/DL (ref 0–1)
BUN SERPL-MCNC: 13 MG/DL (ref 8–22)
CALCIUM SERPL-MCNC: 10 MG/DL (ref 8.5–10.5)
CHLORIDE BLD-SCNC: 104 MMOL/L (ref 98–107)
CHOLEST SERPL-MCNC: 219 MG/DL
CO2 SERPL-SCNC: 29 MMOL/L (ref 22–31)
CREAT SERPL-MCNC: 1.03 MG/DL (ref 0.7–1.3)
ERYTHROCYTE [DISTWIDTH] IN BLOOD BY AUTOMATED COUNT: 12.1 % (ref 10–15)
FASTING STATUS PATIENT QL REPORTED: ABNORMAL
GFR SERPL CREATININE-BSD FRML MDRD: 82 ML/MIN/1.73M2
GLUCOSE BLD-MCNC: 85 MG/DL (ref 70–125)
HCT VFR BLD AUTO: 49.1 % (ref 40–53)
HDLC SERPL-MCNC: 54 MG/DL
HGB BLD-MCNC: 17.2 G/DL (ref 13.3–17.7)
LDLC SERPL CALC-MCNC: 138 MG/DL
MCH RBC QN AUTO: 30.8 PG (ref 26.5–33)
MCHC RBC AUTO-ENTMCNC: 35 G/DL (ref 31.5–36.5)
MCV RBC AUTO: 88 FL (ref 78–100)
PLATELET # BLD AUTO: 178 10E3/UL (ref 150–450)
POTASSIUM BLD-SCNC: 4.8 MMOL/L (ref 3.5–5)
PROT SERPL-MCNC: 7 G/DL (ref 6–8)
PSA SERPL-MCNC: 0.76 UG/L (ref 0–3.5)
RBC # BLD AUTO: 5.58 10E6/UL (ref 4.4–5.9)
SODIUM SERPL-SCNC: 143 MMOL/L (ref 136–145)
TRIGL SERPL-MCNC: 133 MG/DL
WBC # BLD AUTO: 4.6 10E3/UL (ref 4–11)

## 2021-09-17 PROCEDURE — 85027 COMPLETE CBC AUTOMATED: CPT

## 2021-09-17 PROCEDURE — G0103 PSA SCREENING: HCPCS

## 2021-09-17 PROCEDURE — 80061 LIPID PANEL: CPT

## 2021-09-17 PROCEDURE — 82248 BILIRUBIN DIRECT: CPT

## 2021-09-17 PROCEDURE — 36415 COLL VENOUS BLD VENIPUNCTURE: CPT

## 2021-09-17 PROCEDURE — 80053 COMPREHEN METABOLIC PANEL: CPT

## 2021-10-02 ENCOUNTER — HEALTH MAINTENANCE LETTER (OUTPATIENT)
Age: 56
End: 2021-10-02

## 2021-11-02 ENCOUNTER — TRANSFERRED RECORDS (OUTPATIENT)
Dept: HEALTH INFORMATION MANAGEMENT | Facility: CLINIC | Age: 56
End: 2021-11-02
Payer: COMMERCIAL

## 2021-12-18 DIAGNOSIS — F10.20 UNCOMPLICATED ALCOHOL DEPENDENCE (H): ICD-10-CM

## 2021-12-21 NOTE — TELEPHONE ENCOUNTER
Routing refill request to provider for review/approval because:  Drug not on the [facility] refill protocol     Last Written Prescription Date:  8/24/2021  Last Fill Quantity: 90,  # refills: 0   Last office visit provider:  8/24/2021     Requested Prescriptions   Pending Prescriptions Disp Refills     naltrexone (DEPADE/REVIA) 50 MG tablet [Pharmacy Med Name: NALTREXONE 50 MG TABLET] 90 tablet 0     Sig: TAKE 1 TABLET BY MOUTH EVERY DAY       There is no refill protocol information for this order          Najma Ramirez RN 12/20/21 11:21 PM

## 2021-12-22 DIAGNOSIS — Z91.018 ALLERGY TO TREE NUTS: Primary | ICD-10-CM

## 2021-12-22 RX ORDER — NALTREXONE HYDROCHLORIDE 50 MG/1
TABLET, FILM COATED ORAL
Qty: 90 TABLET | Refills: 2 | Status: SHIPPED | OUTPATIENT
Start: 2021-12-22 | End: 2022-10-13

## 2021-12-22 RX ORDER — EPINEPHRINE 0.15 MG/.3ML
0.15 INJECTION INTRAMUSCULAR ONCE
Qty: 2 EACH | Refills: 1 | Status: SHIPPED | OUTPATIENT
Start: 2021-12-22 | End: 2021-12-22

## 2021-12-22 NOTE — TELEPHONE ENCOUNTER
Refill request for the following medication    Epinephrine 0.3 MG Auto Inject  Last filled 08/31/20    Marielle Morejon LPN

## 2021-12-23 DIAGNOSIS — Z91.018 ALLERGY TO TREE NUTS: ICD-10-CM

## 2021-12-23 RX ORDER — EPINEPHRINE 0.15 MG/.3ML
0.15 INJECTION INTRAMUSCULAR ONCE
Qty: 2 EACH | Refills: 1 | Status: CANCELLED | OUTPATIENT
Start: 2021-12-23 | End: 2021-12-23

## 2021-12-28 ENCOUNTER — TELEPHONE (OUTPATIENT)
Dept: FAMILY MEDICINE | Facility: CLINIC | Age: 56
End: 2021-12-28
Payer: COMMERCIAL

## 2021-12-28 DIAGNOSIS — Z91.018 ALLERGY TO TREE NUTS: Primary | ICD-10-CM

## 2021-12-28 RX ORDER — EPINEPHRINE 0.3 MG/.3ML
0.3 INJECTION SUBCUTANEOUS ONCE
Qty: 0.3 ML | Refills: 1 | Status: SHIPPED | OUTPATIENT
Start: 2021-12-28 | End: 2021-12-28

## 2021-12-28 NOTE — TELEPHONE ENCOUNTER
Medication Question/Problem:    Who is calling?  [id number]  What medication is call about? Epi-Pen  Who prescribed the medication?  Maya Stratton CNP   What is question/problem? Pharmacy received Epi-Pen [place].  Was that provider's intention?

## 2021-12-29 ENCOUNTER — E-VISIT (OUTPATIENT)
Dept: FAMILY MEDICINE | Facility: CLINIC | Age: 56
End: 2021-12-29
Payer: COMMERCIAL

## 2021-12-29 DIAGNOSIS — L30.9 ECZEMA, UNSPECIFIED TYPE: Primary | ICD-10-CM

## 2021-12-29 PROCEDURE — 99422 OL DIG E/M SVC 11-20 MIN: CPT | Performed by: NURSE PRACTITIONER

## 2021-12-29 RX ORDER — EPINEPHRINE 0.3 MG/.3ML
0.3 INJECTION SUBCUTANEOUS ONCE
Qty: 0.3 ML | Refills: 0 | Status: SHIPPED | OUTPATIENT
Start: 2021-12-29 | End: 2021-12-29

## 2021-12-29 RX ORDER — TRIAMCINOLONE ACETONIDE 5 MG/G
CREAM TOPICAL 2 TIMES DAILY
Qty: 30 G | Refills: 1 | Status: SHIPPED | OUTPATIENT
Start: 2021-12-29

## 2021-12-29 RX ORDER — HYDROCORTISONE 2.5 %
CREAM (GRAM) TOPICAL 2 TIMES DAILY
Qty: 30 G | Refills: 1 | Status: SHIPPED | OUTPATIENT
Start: 2021-12-29 | End: 2023-09-08

## 2021-12-29 NOTE — TELEPHONE ENCOUNTER
Provider E-Visit time total (minutes): 12    Assessment & Plan   1. Eczema, unspecified type  Uncontrolled eczema.  Will treat with topical corticosteroid twice daily until clear. Lower potency for face.   - hydrocortisone 2.5 % cream; Apply topically 2 times daily As needed for face  Dispense: 30 g; Refill: 1  - triamcinolone (ARISTOCORT HP) 0.5 % external cream; Apply topically 2 times daily  Dispense: 30 g; Refill: 1    Maya Stratton CNP    Subjective     HPI:   Ramírez Whittaker is a 56 year old male who presents for eczema      Allergies:  is allergic to nuts - unspecified [nuts] and milk [lac bovis].    SH/FH:  Social History and Family History reviewed and updated.   Tobacco Status:  He  reports that he has never smoked. He has never used smokeless tobacco.    Review of Systems:  A complete head to toe ROS is negative unless otherwise noted in HPI

## 2022-07-03 ENCOUNTER — HEALTH MAINTENANCE LETTER (OUTPATIENT)
Age: 57
End: 2022-07-03

## 2022-10-12 DIAGNOSIS — F10.20 UNCOMPLICATED ALCOHOL DEPENDENCE (H): ICD-10-CM

## 2022-10-13 RX ORDER — NALTREXONE HYDROCHLORIDE 50 MG/1
TABLET, FILM COATED ORAL
Qty: 90 TABLET | Refills: 0 | Status: SHIPPED | OUTPATIENT
Start: 2022-10-13 | End: 2023-01-06

## 2022-10-13 NOTE — TELEPHONE ENCOUNTER
Former patient of [facility] & has not established care with another provider.  Please assign refill request to covering provider per clinic standard process.    Routing refill request to provider for review/approval because:  Drug not on the [facility] refill protocol   Patient needs to be seen because it has been more than 1 year since last office visit.  No PCP    Last Written Prescription Date:  12/22/21  Last Fill Quantity: 90,  # refills: 2   Last office visit provider:  8/24/21     Requested Prescriptions   Pending Prescriptions Disp Refills     naltrexone (DEPADE/REVIA) 50 MG tablet [Pharmacy Med Name: NALTREXONE 50 MG TABLET] 90 tablet 2     Sig: TAKE 1 TABLET BY MOUTH EVERY DAY       There is no refill protocol information for this order          Jose Rafael Castaneda RN 10/13/22 9:54 AM

## 2023-01-05 DIAGNOSIS — F10.20 UNCOMPLICATED ALCOHOL DEPENDENCE (H): ICD-10-CM

## 2023-01-06 RX ORDER — NALTREXONE HYDROCHLORIDE 50 MG/1
TABLET, FILM COATED ORAL
Qty: 90 TABLET | Refills: 0 | Status: SHIPPED | OUTPATIENT
Start: 2023-01-06 | End: 2023-04-26

## 2023-01-06 NOTE — TELEPHONE ENCOUNTER
Routing refill request to provider for review/approval because:  Drug not on the [facility] refill protocol     Last Written Prescription Date:  10/13/22  Last Fill Quantity: 90,  # refills: 0   Last office visit provider:  8/24/21     Requested Prescriptions   Pending Prescriptions Disp Refills     naltrexone (DEPADE/REVIA) 50 MG tablet [Pharmacy Med Name: NALTREXONE 50 MG TABLET] 90 tablet 0     Sig: TAKE 1 TABLET BY MOUTH EVERY DAY       There is no refill protocol information for this order          NORBERTO QUIROZ RN 01/06/23 11:39 AM

## 2023-01-18 ASSESSMENT — ASTHMA QUESTIONNAIRES
QUESTION_5 LAST FOUR WEEKS HOW WOULD YOU RATE YOUR ASTHMA CONTROL: COMPLETELY CONTROLLED
QUESTION_3 LAST FOUR WEEKS HOW OFTEN DID YOUR ASTHMA SYMPTOMS (WHEEZING, COUGHING, SHORTNESS OF BREATH, CHEST TIGHTNESS OR PAIN) WAKE YOU UP AT NIGHT OR EARLIER THAN USUAL IN THE MORNING: NOT AT ALL
QUESTION_1 LAST FOUR WEEKS HOW MUCH OF THE TIME DID YOUR ASTHMA KEEP YOU FROM GETTING AS MUCH DONE AT WORK, SCHOOL OR AT HOME: NONE OF THE TIME
QUESTION_4 LAST FOUR WEEKS HOW OFTEN HAVE YOU USED YOUR RESCUE INHALER OR NEBULIZER MEDICATION (SUCH AS ALBUTEROL): NOT AT ALL
ACT_TOTALSCORE: 25
QUESTION_2 LAST FOUR WEEKS HOW OFTEN HAVE YOU HAD SHORTNESS OF BREATH: NOT AT ALL
ACT_TOTALSCORE: 25

## 2023-01-25 ENCOUNTER — OFFICE VISIT (OUTPATIENT)
Dept: INTERNAL MEDICINE | Facility: CLINIC | Age: 58
End: 2023-01-25
Payer: COMMERCIAL

## 2023-01-25 VITALS
OXYGEN SATURATION: 97 % | RESPIRATION RATE: 14 BRPM | DIASTOLIC BLOOD PRESSURE: 70 MMHG | WEIGHT: 179 LBS | SYSTOLIC BLOOD PRESSURE: 102 MMHG | HEART RATE: 60 BPM | BODY MASS INDEX: 22.97 KG/M2 | HEIGHT: 74 IN

## 2023-01-25 DIAGNOSIS — F10.20 UNCOMPLICATED ALCOHOL DEPENDENCE (H): ICD-10-CM

## 2023-01-25 DIAGNOSIS — J45.30 MILD PERSISTENT ASTHMA WITHOUT COMPLICATION: Primary | ICD-10-CM

## 2023-01-25 DIAGNOSIS — Z11.59 NEED FOR HEPATITIS C SCREENING TEST: ICD-10-CM

## 2023-01-25 DIAGNOSIS — Z11.4 SCREENING FOR HIV (HUMAN IMMUNODEFICIENCY VIRUS): ICD-10-CM

## 2023-01-25 DIAGNOSIS — Z91.018 ALLERGY TO TREE NUTS: ICD-10-CM

## 2023-01-25 PROCEDURE — 99213 OFFICE O/P EST LOW 20 MIN: CPT | Performed by: INTERNAL MEDICINE

## 2023-01-25 RX ORDER — FLUTICASONE PROPIONATE AND SALMETEROL 250; 50 UG/1; UG/1
1 POWDER RESPIRATORY (INHALATION) EVERY 12 HOURS
Qty: 180 EACH | Refills: 3 | Status: SHIPPED | OUTPATIENT
Start: 2023-01-25

## 2023-01-25 RX ORDER — EPINEPHRINE 0.3 MG/.3ML
0.3 INJECTION SUBCUTANEOUS PRN
Qty: 2 EACH | Refills: 1 | Status: SHIPPED | OUTPATIENT
Start: 2023-01-25

## 2023-01-25 RX ORDER — ALBUTEROL SULFATE 90 UG/1
2 AEROSOL, METERED RESPIRATORY (INHALATION) EVERY 6 HOURS PRN
Qty: 18 G | Refills: 1 | Status: SHIPPED | OUTPATIENT
Start: 2023-01-25 | End: 2023-03-15

## 2023-01-25 NOTE — PROGRESS NOTES
Assessment & Plan     (J45.30) Mild persistent asthma without complication  (primary encounter diagnosis)  Comment: stable  Plan: albuterol (PROAIR HFA/PROVENTIL HFA/VENTOLIN         HFA) 108 (90 Base) MCG/ACT inhaler,         fluticasone-salmeterol (ADVAIR) 250-50 MCG/ACT         inhaler        Will plan to continue on previous medication without changes     (Z91.018) Allergy to tree nuts  Comment: no recent issues  Plan: EPINEPHrine (ANY BX GENERIC EQUIV) 0.3 MG/0.3ML        injection 2-pack        Renewed.    (F10.20) Uncomplicated alcohol dependence (H)  Comment: no significant changes since last check in with previous PCP. 6 a day was recorded at that time. On naltrexone still  Plan: Will plan to continue on previous medication without changes. Denies mental health concerns, working, enjoying family life. Cutting back/off is goal, discussed. Will follow up in the coming 6-9 months.                 No follow-ups on file.    Nirmal Mathews MD  [facility]   Ramírez is a 57 year old, presenting for the following health issues:  Medication Request (Saw Maya Stratton NP in the past, needs refill)      History of Present Illness     Asthma:  He presents for follow up of asthma.  He has no cough, no wheezing, and no shortness of breath. He is not using a relief medication. He does not miss any doses of his controller medication throughout the week.Patient is aware of the following triggers: none. The patient has not had a visit to the Emergency Room, Urgent Care or Hospital due to asthma since the last clinic visit.     Reason for visit:  Rx refills    He eats 2-3 servings of fruits and vegetables daily.He consumes 0 sweetened beverage(s) daily.He exercises with enough effort to increase his heart rate 20 to 29 minutes per day.  He exercises with enough effort to increase his heart rate 4 days per week.   He is taking medications regularly.             Review of Systems  "        Objective    /70   Pulse 60   Resp 14   Ht 1.886 m (6' 2.25\")   Wt 81.2 kg (179 lb)   SpO2 97%   BMI 22.83 kg/m    Body mass index is 22.83 kg/m .  Physical Exam                       "

## 2023-03-15 DIAGNOSIS — J45.30 MILD PERSISTENT ASTHMA WITHOUT COMPLICATION: ICD-10-CM

## 2023-03-15 RX ORDER — ALBUTEROL SULFATE 90 UG/1
AEROSOL, METERED RESPIRATORY (INHALATION)
Qty: 18 G | Refills: 1 | Status: SHIPPED | OUTPATIENT
Start: 2023-03-15

## 2023-03-15 NOTE — TELEPHONE ENCOUNTER
"Last Written Prescription Date:  1/25/2023  Last Fill Quantity: 18g,  # refills: 1   Last office visit provider:  1/25/2023     Requested Prescriptions   Pending Prescriptions Disp Refills     VENTOLIN  (90 Base) MCG/ACT inhaler [Pharmacy Med Name: VENTOLIN HFA 90 MCG INHALER]  1     Sig: INHALE 2 PUFFS BY MOUTH EVERY 6 HOURS AS NEEDED FOR WHEEZE       Asthma Maintenance Inhalers - Anticholinergics Passed - 3/15/2023 12:36 AM        Passed - Patient is age 12 years or older        Passed - Asthma control assessment score within normal limits in last 6 months     Please review ACT score.           Passed - Medication is active on med list        Passed - Recent (6 mo) or future (30 days) visit within the authorizing provider's specialty     Patient had office visit in the last 6 months or has a visit in the next 30 days with authorizing provider or within the authorizing provider's specialty.  See \"Patient Info\" tab in inbasket, or \"Choose Columns\" in Meds & Orders section of the refill encounter.           Short-Acting Beta Agonist Inhalers Protocol  Passed - 3/15/2023 12:36 AM        Passed - Patient is age 12 or older        Passed - Asthma control assessment score within normal limits in last 6 months     Please review ACT score.           Passed - Medication is active on med list        Passed - Recent (6 mo) or future (30 days) visit within the authorizing provider's specialty     Patient had office visit in the last 6 months or has a visit in the next 30 days with authorizing provider or within the authorizing provider's specialty.  See \"Patient Info\" tab in inbasket, or \"Choose Columns\" in Meds & Orders section of the refill encounter.                 Najma Ramirez RN 03/15/23 4:51 PM  "

## 2023-04-07 NOTE — PATIENT INSTRUCTIONS - HE
Recommendations from today's visit                                                       1. We will restart you on naltrexone at 1/2 tab for one week and then increase to a full tab daily.  I would like to see you back in 4-6 weeks to see how it is going and recheck your liver function.      2. For your feet, I have placed a referral to podiatry    3. Asthma well controlled.  Your inhalers were refilled.     Next appointment: 4-6 weeks, try to come fasting for fasting labs.     To reschedule your appointment, please call the clinic directly at [phone].   It was a pleasure seeing you today! I look forward to seeing you again.           Elidel Counseling: Patient may experience a mild burning sensation during topical application. Elidel is not approved in children less than 2 years of age. There have been case reports of hematologic and skin malignancies in patients using topical calcineurin inhibitors although causality is questionable.

## 2023-04-26 DIAGNOSIS — F10.20 UNCOMPLICATED ALCOHOL DEPENDENCE (H): ICD-10-CM

## 2023-04-26 RX ORDER — NALTREXONE HYDROCHLORIDE 50 MG/1
TABLET, FILM COATED ORAL
Qty: 90 TABLET | Refills: 0 | Status: SHIPPED | OUTPATIENT
Start: 2023-04-26 | End: 2023-07-26

## 2023-07-22 ENCOUNTER — HEALTH MAINTENANCE LETTER (OUTPATIENT)
Age: 58
End: 2023-07-22

## 2023-07-26 DIAGNOSIS — F10.20 UNCOMPLICATED ALCOHOL DEPENDENCE (H): ICD-10-CM

## 2023-07-26 RX ORDER — NALTREXONE HYDROCHLORIDE 50 MG/1
TABLET, FILM COATED ORAL
Qty: 90 TABLET | Refills: 0 | Status: SHIPPED | OUTPATIENT
Start: 2023-07-26 | End: 2023-10-26

## 2023-09-01 ASSESSMENT — ENCOUNTER SYMPTOMS
PALPITATIONS: 0
COUGH: 0
HEMATOCHEZIA: 0
NAUSEA: 0
DIARRHEA: 0
WEAKNESS: 0
PARESTHESIAS: 0
ABDOMINAL PAIN: 0
DYSURIA: 0
MYALGIAS: 0
DIZZINESS: 0
CHILLS: 0
FEVER: 0
HEADACHES: 0
JOINT SWELLING: 0
HEMATURIA: 0
SORE THROAT: 0
SHORTNESS OF BREATH: 0
CONSTIPATION: 0
ARTHRALGIAS: 0
NERVOUS/ANXIOUS: 0
EYE PAIN: 0
FREQUENCY: 0
HEARTBURN: 0

## 2023-09-01 ASSESSMENT — ASTHMA QUESTIONNAIRES: ACT_TOTALSCORE: 25

## 2023-09-08 ENCOUNTER — OFFICE VISIT (OUTPATIENT)
Dept: FAMILY MEDICINE | Facility: CLINIC | Age: 58
End: 2023-09-08
Payer: COMMERCIAL

## 2023-09-08 VITALS
BODY MASS INDEX: 21.94 KG/M2 | WEIGHT: 171 LBS | HEART RATE: 78 BPM | HEIGHT: 74 IN | SYSTOLIC BLOOD PRESSURE: 106 MMHG | RESPIRATION RATE: 17 BRPM | OXYGEN SATURATION: 97 % | TEMPERATURE: 98.4 F | DIASTOLIC BLOOD PRESSURE: 63 MMHG

## 2023-09-08 DIAGNOSIS — J45.30 MILD PERSISTENT ASTHMA WITHOUT COMPLICATION: ICD-10-CM

## 2023-09-08 DIAGNOSIS — Z11.59 NEED FOR HEPATITIS C SCREENING TEST: ICD-10-CM

## 2023-09-08 DIAGNOSIS — Z00.00 ROUTINE GENERAL MEDICAL EXAMINATION AT A HEALTH CARE FACILITY: ICD-10-CM

## 2023-09-08 DIAGNOSIS — F10.20 UNCOMPLICATED ALCOHOL DEPENDENCE (H): ICD-10-CM

## 2023-09-08 DIAGNOSIS — L30.9 ECZEMA, UNSPECIFIED TYPE: ICD-10-CM

## 2023-09-08 DIAGNOSIS — Z12.5 SCREENING FOR PROSTATE CANCER: ICD-10-CM

## 2023-09-08 DIAGNOSIS — Z11.4 SCREENING FOR HIV (HUMAN IMMUNODEFICIENCY VIRUS): ICD-10-CM

## 2023-09-08 LAB
ALBUMIN SERPL BCG-MCNC: 4.2 G/DL (ref 3.5–5.2)
ALP SERPL-CCNC: 55 U/L (ref 40–129)
ALT SERPL W P-5'-P-CCNC: 24 U/L (ref 0–70)
ANION GAP SERPL CALCULATED.3IONS-SCNC: 11 MMOL/L (ref 7–15)
AST SERPL W P-5'-P-CCNC: 25 U/L (ref 0–45)
BILIRUB SERPL-MCNC: 0.6 MG/DL
BUN SERPL-MCNC: 18 MG/DL (ref 6–20)
CALCIUM SERPL-MCNC: 9.6 MG/DL (ref 8.6–10)
CHLORIDE SERPL-SCNC: 105 MMOL/L (ref 98–107)
CHOLEST SERPL-MCNC: 208 MG/DL
CREAT SERPL-MCNC: 1.04 MG/DL (ref 0.67–1.17)
DEPRECATED HCO3 PLAS-SCNC: 26 MMOL/L (ref 22–29)
EGFRCR SERPLBLD CKD-EPI 2021: 84 ML/MIN/1.73M2
GLUCOSE SERPL-MCNC: 111 MG/DL (ref 70–99)
HBA1C MFR BLD: 4.9 % (ref 0–5.6)
HDLC SERPL-MCNC: 77 MG/DL
LDLC SERPL CALC-MCNC: 78 MG/DL
NONHDLC SERPL-MCNC: 131 MG/DL
POTASSIUM SERPL-SCNC: 4.1 MMOL/L (ref 3.4–5.3)
PROT SERPL-MCNC: 6.9 G/DL (ref 6.4–8.3)
PSA SERPL DL<=0.01 NG/ML-MCNC: 0.67 NG/ML (ref 0–3.5)
SODIUM SERPL-SCNC: 142 MMOL/L (ref 136–145)
TRIGL SERPL-MCNC: 267 MG/DL

## 2023-09-08 PROCEDURE — 87389 HIV-1 AG W/HIV-1&-2 AB AG IA: CPT | Performed by: NURSE PRACTITIONER

## 2023-09-08 PROCEDURE — 83036 HEMOGLOBIN GLYCOSYLATED A1C: CPT | Performed by: NURSE PRACTITIONER

## 2023-09-08 PROCEDURE — 86803 HEPATITIS C AB TEST: CPT | Performed by: NURSE PRACTITIONER

## 2023-09-08 PROCEDURE — 90472 IMMUNIZATION ADMIN EACH ADD: CPT | Performed by: NURSE PRACTITIONER

## 2023-09-08 PROCEDURE — 99396 PREV VISIT EST AGE 40-64: CPT | Mod: 25 | Performed by: NURSE PRACTITIONER

## 2023-09-08 PROCEDURE — 90746 HEPB VACCINE 3 DOSE ADULT IM: CPT | Performed by: NURSE PRACTITIONER

## 2023-09-08 PROCEDURE — 80053 COMPREHEN METABOLIC PANEL: CPT | Performed by: NURSE PRACTITIONER

## 2023-09-08 PROCEDURE — 99213 OFFICE O/P EST LOW 20 MIN: CPT | Mod: 25 | Performed by: NURSE PRACTITIONER

## 2023-09-08 PROCEDURE — 36415 COLL VENOUS BLD VENIPUNCTURE: CPT | Performed by: NURSE PRACTITIONER

## 2023-09-08 PROCEDURE — G0103 PSA SCREENING: HCPCS | Performed by: NURSE PRACTITIONER

## 2023-09-08 PROCEDURE — 80061 LIPID PANEL: CPT | Performed by: NURSE PRACTITIONER

## 2023-09-08 PROCEDURE — 90677 PCV20 VACCINE IM: CPT | Performed by: NURSE PRACTITIONER

## 2023-09-08 PROCEDURE — 90471 IMMUNIZATION ADMIN: CPT | Performed by: NURSE PRACTITIONER

## 2023-09-08 RX ORDER — HYDROCORTISONE 2.5 %
CREAM (GRAM) TOPICAL 2 TIMES DAILY
Qty: 30 G | Refills: 1 | Status: SHIPPED | OUTPATIENT
Start: 2023-09-08

## 2023-09-08 RX ORDER — LORATADINE 10 MG/1
10 TABLET ORAL DAILY
COMMUNITY
Start: 2023-07-01

## 2023-09-08 ASSESSMENT — ENCOUNTER SYMPTOMS
DIZZINESS: 0
COUGH: 0
CHILLS: 0
SHORTNESS OF BREATH: 0
DYSURIA: 0
HEMATOCHEZIA: 0
JOINT SWELLING: 0
FREQUENCY: 0
DIARRHEA: 0
FEVER: 0
SORE THROAT: 0
HEADACHES: 0
NERVOUS/ANXIOUS: 0
HEARTBURN: 0
PALPITATIONS: 0
ABDOMINAL PAIN: 0
ARTHRALGIAS: 0
EYE PAIN: 0
NAUSEA: 0
PARESTHESIAS: 0
MYALGIAS: 0
WEAKNESS: 0
CONSTIPATION: 0
HEMATURIA: 0

## 2023-09-08 NOTE — NURSING NOTE
Prior to immunization administration, verified patients identity using patient s name and date of birth. Please see Immunization Activity for additional information.     Screening Questionnaire for Adult Immunization    Are you sick today?   No   Do you have allergies to medications, food, a vaccine component or latex?   Yes-nurts and milk   Have you ever had a serious reaction after receiving a vaccination?   No   Do you have a long-term health problem with heart, lung, kidney, or metabolic disease (e.g., diabetes), asthma, a blood disorder, no spleen, complement component deficiency, a cochlear implant, or a spinal fluid leak?  Are you on long-term aspirin therapy?   Yes- asthma   Do you have cancer, leukemia, HIV/AIDS, or any other immune system problem?   No   Do you have a parent, brother, or sister with an immune system problem?   No   In the past 3 months, have you taken medications that affect  your immune system, such as prednisone, other steroids, or anticancer drugs; drugs for the treatment of rheumatoid arthritis, Crohn s disease, or psoriasis; or have you had radiation treatments?   Yes-prednisone   Have you had a seizure, or a brain or other nervous system problem?   No   During the past year, have you received a transfusion of blood or blood    products, or been given immune (gamma) globulin or antiviral drug?   No   For women: Are you pregnant or is there a chance you could become       pregnant during the next month?   No   Have you received any vaccinations in the past 4 weeks?   No     Immunization questionnaire was positive for at least one answer.  Notified Lorna Crocker. Gave verbal permission to proceed with vaccines.       Patient instructed to remain in clinic for 15 minutes afterwards, and to report any adverse reactions.     Screening performed by Alida Peoples RN on 9/8/2023 at 3:56 PM.

## 2023-09-08 NOTE — LETTER
My Asthma Action Plan    Name: Ramírez Whittaker   YOB: 1965  Date: 9/8/2023   My doctor: MELODIE Ortega CNP   My clinic: [facility]        My Control Medicine: Advair  My Rescue Medicine: Albuterol (Proair/Ventolin/Proventil HFA) 2-4 puffs EVERY 4 HOURS as needed. Use a spacer if recommended by your provider.   My Asthma Severity:   Mild Persistent  Know your asthma triggers: exercise or sports and cold air               GREEN ZONE   Good Control  I feel good  No cough or wheeze  Can work, sleep and play without asthma symptoms       Take your asthma control medicine every day.     If exercise triggers your asthma, take your rescue medication  15 minutes before exercise or sports, and  During exercise if you have asthma symptoms  Spacer to use with inhaler: If you have a spacer, make sure to use it with your inhaler             YELLOW ZONE Getting Worse  I have ANY of these:  I do not feel good  Cough or wheeze  Chest feels tight  Wake up at night   Keep taking your Green Zone medications  Start taking your rescue medicine:  every 20 minutes for up to 1 hour. Then every 4 hours for 24-48 hours.  If you stay in the Yellow Zone for more than 12-24 hours, contact your doctor.  If you do not return to the Green Zone in 12-24 hours or you get worse, start taking your oral steroid medicine if prescribed by your provider.           RED ZONE Medical Alert - Get Help  I have ANY of these:  I feel awful  Medicine is not helping  Breathing getting harder  Trouble walking or talking  Nose opens wide to breathe       Take your rescue medicine NOW  If your provider has prescribed an oral steroid medicine, start taking it NOW  Call your doctor NOW  If you are still in the Red Zone after 20 minutes and you have not reached your doctor:  Take your rescue medicine again and  Call 911 or go to the emergency room right away    See your regular doctor within 2 weeks of an Emergency  Room or Urgent Care visit for follow-up treatment.          Annual Reminders:  Meet with Asthma Educator,  Flu Shot in the Fall, consider Pneumonia Vaccination for patients with asthma (aged 19 and older).    Pharmacy:    [id number] - [place], [place] - [address]  [id number] [facility] - [place], [place] - [address]    Electronically signed by MELODIE Ortega CNP   Date: 09/08/23                      Asthma Triggers  How To Control Things That Make Your Asthma Worse    Triggers are things that make your asthma worse.  Look at the list below to help you find your triggers and what you can do about them.  You can help prevent asthma flare-ups by staying away from your triggers.      Trigger                                                          What you can do   Cigarette Smoke  Tobacco smoke can make asthma worse. Do not allow smoking in your home, car or around you.  Be sure no one smokes at a child s day care or school.  If you smoke, ask your health care provider for ways to help you quit.  Ask family members to quit too.  Ask your health care provider for a referral to Quit Plan to help you quit smoking, or call 8-317-392-PLAN.     Colds, Flu, Bronchitis  These are common triggers of asthma. Wash your hands often.  Don t touch your eyes, nose or mouth.  Get a flu shot every year.     Dust Mites  These are tiny bugs that live in cloth or carpet. They are too small to see. Wash sheets and blankets in hot water every week.   Encase pillows and mattress in dust mite proof covers.  Avoid having carpet if you can. If you have carpet, vacuum weekly.   Use a dust mask and HEPA vacuum.   Pollen and Outdoor Mold  Some people are allergic to trees, grass, or weed pollen, or molds. Try to keep your windows closed.  Limit time out doors when pollen count is high.   Ask you health care provider about taking medicine during allergy season.     Animal Dander  Some people are allergic to skin  flakes, urine or saliva from pets with fur or feathers. Keep pets with fur or feathers out of your home.    If you can t keep the pet outdoors, then keep the pet out of your bedroom.  Keep the bedroom door closed.  Keep pets off cloth furniture and away from stuffed toys.     Mice, Rats, and Cockroaches   Some people are allergic to the waste from these pests.   Cover food and garbage.  Clean up spills and food crumbs.  Store grease in the refrigerator.   Keep food out of the bedroom.   Indoor Mold  This can be a trigger if your home has high moisture. Fix leaking faucets, pipes, or other sources of water.   Clean moldy surfaces.  Dehumidify basement if it is damp and smelly.   Smoke, Strong Odors, and Sprays  These can reduce air quality. Stay away from strong odors and sprays, such as perfume, powder, hair spray, paints, smoke incense, paint, cleaning products, candles and new carpet.   Exercise or Sports  Some people with asthma have this trigger. Be active!  Ask your doctor about taking medicine before sports or exercise to prevent symptoms.    Warm up for 5-10 minutes before and after sports or exercise.     Other Triggers of Asthma  Cold air:  Cover your nose and mouth with a scarf.  Sometimes laughing or crying can be a trigger.  Some medicines and food can trigger asthma.

## 2023-09-08 NOTE — PROGRESS NOTES
SUBJECTIVE:   CC: Ramírez is an 57 year old who presents for preventative health visit.       9/8/2023     2:46 PM   Additional Questions   Roomed by kenia jefferson   Accompanied by troy         9/8/2023     2:46 PM   Patient Reported Additional Medications   Patient reports taking the following new medications loratadine       Healthy Habits:     Getting at least 3 servings of Calcium per day:  Yes    Bi-annual eye exam:  Yes    Dental care twice a year:  Yes    Sleep apnea or symptoms of sleep apnea:  None    Diet:  Regular (no restrictions)    Frequency of exercise:  2-3 days/week    Duration of exercise:  15-30 minutes    Taking medications regularly:  Yes    Medication side effects:  Not applicable    Additional concerns today:  No      Today's PHQ-2 Score:       9/8/2023     2:30 PM   PHQ-2 ( 1999 [employer])   Q1: Little interest or pleasure in doing things 0   Q2: Feeling down, depressed or hopeless 0   PHQ-2 Score 0   Q1: Little interest or pleasure in doing things Not at all   Q2: Feeling down, depressed or hopeless Not at all   PHQ-2 Score 0     Have you ever done Advance Care Planning? (For example, a Health Directive, POLST, or a discussion with a medical provider or your loved ones about your wishes): No, advance care planning information given to patient to review.  Patient plans to discuss their wishes with loved ones or provider.      Social History     Tobacco Use    Smoking status: Never    Smokeless tobacco: Never    Tobacco comments:     No passive exposure   Substance Use Topics    Alcohol use: Yes             9/1/2023     4:06 PM   Alcohol Use   Prescreen: >3 drinks/day or >7 drinks/week? Yes   AUDIT SCORE  9   Denies drug use- uses medical CBD oil.     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   Lives with wife- has 2 grown children. Feels safe at home    Last PSA:   Prostate Specific Antigen Screen   Date Value Ref Range Status   09/17/2021 0.76 0.00 - 3.50 ug/L Final       Reviewed  orders with patient. Reviewed health maintenance and updated orders accordingly - Yes  Lab work is in process  Labs reviewed in EPIC  Patient Active Problem List   Diagnosis    Allergy to tree nuts    Uncomplicated alcohol dependence (H)    Mild persistent asthma without complication     Past Surgical History:   Procedure Laterality Date    WISDOM TOOTH EXTRACTION         Social History     Tobacco Use    Smoking status: Never    Smokeless tobacco: Never    Tobacco comments:     No passive exposure   Substance Use Topics    Alcohol use: Yes     Family History   Problem Relation Age of Onset    Valvular heart disease Brother     Heart Disease Father     Crohn's Disease Sister          Current Outpatient Medications   Medication Sig Dispense Refill    CANNABIDIOL, CBD, EXTRACT ORAL [CANNABIDIOL, CBD, EXTRACT ORAL] Take by mouth.      EPINEPHrine (ANY BX GENERIC EQUIV) 0.3 MG/0.3ML injection 2-pack Inject 0.3 mLs (0.3 mg) into the muscle as needed for anaphylaxis May repeat one time in 5-15 minutes if response to initial dose is inadequate. 2 each 1    hydrocortisone 2.5 % cream Apply topically 2 times daily As needed for face 30 g 1    L.acidophilus-Bif. animalis (PROBIOTIC) 5 billion cell CpSP [L.ACIDOPHILUS-BIF. ANIMALIS (PROBIOTIC) 5 BILLION CELL CPSP] Take 1 capsule by mouth daily.      loratadine (CLARITIN) 10 MG tablet Take 10 mg by mouth daily      naltrexone (DEPADE/REVIA) 50 MG tablet TAKE 1 TABLET BY MOUTH EVERY DAY 90 tablet 0    triamcinolone (ARISTOCORT HP) 0.5 % external cream Apply topically 2 times daily 30 g 1    VENTOLIN  (90 Base) MCG/ACT inhaler INHALE 2 PUFFS BY MOUTH EVERY 6 HOURS AS NEEDED FOR WHEEZE 18 g 1    fluticasone-salmeterol (ADVAIR) 250-50 MCG/ACT inhaler Inhale 1 puff into the lungs every 12 hours 180 each 3       Reviewed and updated as needed this visit by clinical staff   Tobacco  Allergies  Meds  Problems  Med Hx  Surg Hx  Fam Hx          Reviewed and updated as  "needed this visit by Provider   Tobacco  Allergies  Meds  Problems  Med Hx  Surg Hx  Fam Hx         Past Medical History:   Diagnosis Date    Asthma         Review of Systems   Constitutional:  Negative for chills and fever.   HENT:  Negative for congestion, ear pain, hearing loss and sore throat.    Eyes:  Negative for pain and visual disturbance.   Respiratory:  Negative for cough and shortness of breath.    Cardiovascular:  Negative for chest pain, palpitations and peripheral edema.   Gastrointestinal:  Negative for abdominal pain, constipation, diarrhea, heartburn, hematochezia and nausea.   Genitourinary:  Negative for dysuria, frequency, genital sores, hematuria, impotence, penile discharge and urgency.   Musculoskeletal:  Negative for arthralgias, joint swelling and myalgias.   Skin:  Negative for rash.   Neurological:  Negative for dizziness, weakness, headaches and paresthesias.   Psychiatric/Behavioral:  Negative for mood changes. The patient is not nervous/anxious.      OBJECTIVE:   /63 (BP Location: Left arm, Patient Position: Sitting, Cuff Size: Adult Regular)   Pulse 78   Temp 98.4  F (36.9  C) (Oral)   Resp 17   Ht 1.88 m (6' 2\")   Wt 77.6 kg (171 lb)   SpO2 97%   BMI 21.96 kg/m      Physical Exam  GENERAL: healthy, alert and no distress  EYES: Eyes grossly normal to inspection, PERRL and conjunctivae and sclerae normal  HENT: ear canals and TM's normal, nose and mouth without ulcers or lesions  NECK: no adenopathy, no asymmetry, masses, or scars and thyroid normal to palpation  RESP: lungs clear to auscultation - no rales, rhonchi or wheezes  CV: regular rate and rhythm, normal S1 S2, no S3 or S4, no murmur, click or rub, no peripheral edema and peripheral pulses strong  ABDOMEN: soft, nontender, no hepatosplenomegaly, no masses and bowel sounds normal  MS: no gross musculoskeletal defects noted, no edema  SKIN: no suspicious lesions or rashes  NEURO: Normal strength and tone, " mentation intact and speech normal  PSYCH: mentation appears normal, affect normal/bright    Diagnostic Test Results:  Labs reviewed in Epic    ASSESSMENT/PLAN:   Ramírez was seen today for physical and recheck medication.    Diagnoses and all orders for this visit:    Routine general medical examination at a health care facility  Overall, in good general health. Asthma is well controlled.     -     REVIEW OF HEALTH MAINTENANCE PROTOCOL ORDERS  -     HEPATITIS B VACCINE ADULT 3 DOSE IM (ENGERIX-B/RECOMBIVAX HB)  -     Pneumococcal 20 Valent Conjugate (Prevnar 20)  -     Lipid panel reflex to direct LDL Fasting; Future  -     Comprehensive metabolic panel; Future  -     Hemoglobin A1c; Future  -     Primary Care - Care Coordination Referral; Future  -     PRIMARY CARE FOLLOW-UP SCHEDULING; Future  -     HEPATITIS B, ADULT (ENGERIX-B/RECOMBIVAX HB); Future    Screening for HIV (human immunodeficiency virus)  -     HIV Antigen Antibody Combo; Future    Need for hepatitis C screening test  -     Hepatitis C Screen Reflex to HCV RNA Quant and Genotype; Future    Eczema, unspecified type  -     hydrocortisone 2.5 % cream; Apply topically 2 times daily As needed for face    Screening for prostate cancer  -     PSA, screen; Future    Mild persistent asthma without complication  ACT score 25-  Well controlled with Advair -continue same tx plan    Uncomplicated alcohol dependence (H)  On Naltrexone for about 5 years  4-6 drinks a day  Plan: Continue medication as previous. Denies mental health concerns.  Agreeable to care manager for resources - not sure if he will actually follow through. Strongly encouraged on cutting back - alcohol cessation.  Follow up in 6 months       Patient has been advised of split billing requirements and indicates understanding: Yes      COUNSELING:   Reviewed preventive health counseling, as reflected in patient instructions    He reports that he has never smoked. He has never used smokeless  tobacco.            MELODIE Ortega CNP  [facility]

## 2023-09-09 LAB
HCV AB SERPL QL IA: NONREACTIVE
HIV 1+2 AB+HIV1 P24 AG SERPL QL IA: NONREACTIVE

## 2023-09-11 ENCOUNTER — PATIENT OUTREACH (OUTPATIENT)
Dept: CARE COORDINATION | Facility: CLINIC | Age: 58
End: 2023-09-11
Payer: COMMERCIAL

## 2023-09-11 NOTE — PROGRESS NOTES
Clinic Care Coordination Contact  [facility]/Voicemail     Clinical Data: Care Coordinator Outreach  Outreach attempted x 1. Left message on patient's voicemail with call back information and requested return call.    Plan: Care Coordinator will try to reach patient again in 1-2 business days or on 9/12/23.      Order Questions    Question Answer   Reason for Referral: Other   My Clinical Question Is: resources for alcohol abuse   Clinical Staff have discussed the Care Coordination Referral with the patient and/or caregiver: Yes         Melvi Yarbrough  Community Health Worker   [facility] Care Coordination  [facility]   [email or web address]  Office: [phone]

## 2023-09-12 ENCOUNTER — PATIENT OUTREACH (OUTPATIENT)
Dept: CARE COORDINATION | Facility: CLINIC | Age: 58
End: 2023-09-12
Payer: COMMERCIAL

## 2023-09-12 NOTE — LETTER
[facility] CARE COORDINATION  [facility]  September 12, 2023    Ramírez Whittaker  [address] [address]      Dear Ramírez,        I am a  clinic care coordinator who works with Nirmal Mathews MD with the [facility]. I have been trying to reach you recently to introduce Clinic Care Coordination. Below is a description of clinic care coordination and how I can further assist you.       The clinic care coordination team is made up of a registered nurse, , financial resource worker and community health worker who understand the health care system. The goal of clinic care coordination is to help you manage your health and improve access to the health care system. Our team works alongside your provider to assist you in determining your health and social needs. We can help you obtain health care and community resources, providing you with necessary information and education. We can work with you through any barriers and develop a care plan that helps coordinate and strengthen the communication between you and your care team.  Our services are voluntary and are offered without charge to you personally.    Please feel free to contact me with any questions or concerns regarding care coordination and what we can offer.      We are focused on providing you with the highest-quality healthcare experience possible.    Sincerely,     Melvi Yarbrough  Community Health Worker   [facility] Care Coordination  [facility]   [email or web address]  Office: [phone]

## 2023-09-12 NOTE — PROGRESS NOTES
Clinic Care Coordination Contact  [facility]/Voicemail     Clinical Data: Care Coordinator Outreach  Outreach attempted x 2. Left message on patient's home and mobile voicemail with call back information and requested return call.    Plan: Care Coordinator will send care coordination introduction letter with care coordinator contact information and explanation of care coordination services via [employer]. Care Coordinator will do no further outreaches at this time.      Order Questions    Question Answer   Reason for Referral: Other   My Clinical Question Is: resources for alcohol abuse   Clinical Staff have discussed the Care Coordination Referral with the patient and/or caregiver: Yes         Melvi Yarbrough  Community Health Worker   [facility] Care Coordination  [facility]   [email or web address]  Office: [phone]

## 2023-10-09 ENCOUNTER — ALLIED HEALTH/NURSE VISIT (OUTPATIENT)
Dept: FAMILY MEDICINE | Facility: CLINIC | Age: 58
End: 2023-10-09
Payer: COMMERCIAL

## 2023-10-09 DIAGNOSIS — Z00.00 ROUTINE GENERAL MEDICAL EXAMINATION AT A HEALTH CARE FACILITY: ICD-10-CM

## 2023-10-09 PROCEDURE — 90746 HEPB VACCINE 3 DOSE ADULT IM: CPT

## 2023-10-09 PROCEDURE — 99207 PR NO CHARGE NURSE ONLY: CPT

## 2023-10-09 PROCEDURE — 90471 IMMUNIZATION ADMIN: CPT

## 2023-10-26 DIAGNOSIS — F10.20 UNCOMPLICATED ALCOHOL DEPENDENCE (H): ICD-10-CM

## 2023-10-26 RX ORDER — NALTREXONE HYDROCHLORIDE 50 MG/1
TABLET, FILM COATED ORAL
Qty: 90 TABLET | Refills: 0 | Status: SHIPPED | OUTPATIENT
Start: 2023-10-26 | End: 2024-02-06

## 2023-12-13 NOTE — TELEPHONE ENCOUNTER
"Routing refill request to provider for review/approval because:  Refill too soon.     Last Written Prescription Date:  12/22/2021  Last Fill Quantity: 2,  # refills: 1   Last office visit provider:  8/24/2021     Requested Prescriptions   Pending Prescriptions Disp Refills     EPINEPHrine (EPIPEN JR) 0.15 MG/0.3ML injection 2-pack 2 each 1     Sig: Inject 0.3 mLs (0.15 mg) into the muscle once for 1 dose       Anaphylaxis Kits Protocol Passed - 12/23/2021 10:56 AM        Passed - Recent (12 mo) or future (30 days) visit witin the authorizing provider's specialty     Patient has had an office visit with the authorizing provider or a provider within the authorizing providers department within the previous 12 mos or has a future within next 30 days. See \"Patient Info\" tab in inbasket, or \"Choose Columns\" in Meds & Orders section of the refill encounter.              Passed - Patient is age 5 or older        Passed - Medication is active on med list             Rocio Renteria RN 12/25/21 9:30 PM  " Transition of Care Plan:     RUR: 12%  Prior Level of Functioning: Dependent  Disposition: Home   If SNF or IPR: Date FOC offered:   Date FOC received:   Accepting facility:   Date authorization started with reference number:   Date authorization received and expires: Follow up appointments: PCP, Specialist  DME needed: none- ahas home oxygen 3L   Transportation at discharge: Family to provide transportation  IM/IMM Medicare/ letter given: 2nd Im letter given   Is patient a  and connected with [facility]? N/a              If yes, was [employer] transfer form completed and [facility] notified? Caregiver Contact: Vandana Carlson (Spouse)                                    [phone] (Mobile)   Discharge Caregiver contacted prior to discharge? If pt desires  Care Conference needed? none  Barriers to discharge: none     Pt cleared for d/c from CM standpoint. CM discussed  D/c plan with pt at bedside, pt agreed. D/cing with shai. CM attempted to make f/u appointment, office was called. CM call tomorrow to schedule and contact pt once scheduled. No other CM needs identified.          12/13/23 Dom Reza Discharge   Transition of Care Consult (CM Consult) N/A   Services At/After Discharge None   Mode of Transport at Discharge Other (see comment)  (Wife to provide transportation)   Hospital Transport Time of Discharge 1600   Condition of Participation: Discharge Planning   The Plan for Transition of Care is related to the following treatment goals: [address]  Phone: [phone]

## 2024-02-06 DIAGNOSIS — F10.20 UNCOMPLICATED ALCOHOL DEPENDENCE (H): ICD-10-CM

## 2024-02-06 RX ORDER — NALTREXONE HYDROCHLORIDE 50 MG/1
TABLET, FILM COATED ORAL
Qty: 90 TABLET | Refills: 1 | Status: SHIPPED | OUTPATIENT
Start: 2024-02-06

## 2024-08-13 ENCOUNTER — PATIENT OUTREACH (OUTPATIENT)
Dept: CARE COORDINATION | Facility: CLINIC | Age: 59
End: 2024-08-13
Payer: COMMERCIAL

## 2024-08-27 ENCOUNTER — PATIENT OUTREACH (OUTPATIENT)
Dept: CARE COORDINATION | Facility: CLINIC | Age: 59
End: 2024-08-27
Payer: COMMERCIAL

## 2024-10-31 DIAGNOSIS — Z91.018 ALLERGY TO TREE NUTS: ICD-10-CM

## 2024-11-01 RX ORDER — EPINEPHRINE 0.3 MG/.3ML
0.3 INJECTION SUBCUTANEOUS PRN
Qty: 2 EACH | Refills: 0 | Status: SHIPPED | OUTPATIENT
Start: 2024-11-01

## 2024-11-17 ENCOUNTER — HEALTH MAINTENANCE LETTER (OUTPATIENT)
Age: 59
End: 2024-11-17